# Patient Record
Sex: MALE | Race: BLACK OR AFRICAN AMERICAN | NOT HISPANIC OR LATINO | Employment: FULL TIME | URBAN - METROPOLITAN AREA
[De-identification: names, ages, dates, MRNs, and addresses within clinical notes are randomized per-mention and may not be internally consistent; named-entity substitution may affect disease eponyms.]

---

## 2018-01-11 NOTE — RESULT NOTES
Verified Results  XR SPINE LUMBAR COMPLETE [facility] MINIMUM 6 VIEWS [id number] 08:31AM [facility] Order Number: [id number]     Test Name Result Flag Reference   XR SPINE LUMBAR COMPLETE W BENDING MINIMUM 6 VIEWS (Report)     LUMBAR SPINE     INDICATION: Low back pain extending into right hip increasing over last several months     COMPARISON: March 5, 2015     VIEWS: AP, bilateral oblique, coned down and lateral projections in neutral, flexion and extension; 8 images     FINDINGS:     Alignment is unremarkable  There is no subluxation and alignment is stable in flexion, extension, and neutral positioning  There is no radiographic evidence of acute fracture or destructive osseous lesion  No significant lumbar degenerative change noted  Visualized soft tissues appear unremarkable         IMPRESSION:     Unremarkable lumbar spine exam        Workstation performed: [id number]     Signed by:   Ita Hong MD   10/25/16

## 2018-01-15 NOTE — RESULT NOTES
Verified Results  (1) CBC/PLT/DIFF [id number] 01:44PM [facility]     Test Name Result Flag Reference   WHITE BLOOD CELL COUNT 2 9 Thousand/uL L 3 8-10 8   RED BLOOD CELL COUNT 4 74 Million/uL  4 20-5 80   HEMOGLOBIN 14 3 g/dL  13 2-17 1   HEMATOCRIT 42 9 %  38 5-50 0   MCV 90 6 fL  80 0-100 0   MCH 30 2 pg  27 0-33 0   MCHC 33 4 g/dL  32 0-36 0   RDW 12 4 %  11 0-15 0   PLATELET COUNT 902 Thousand/uL  140-400   MPV 9 4 fL  7 5-11 5   ABSOLUTE NEUTROPHILS 1322 cells/uL L 6327-1233   ABSOLUTE LYMPHOCYTES 1357 cells/uL  850-3900   ABSOLUTE MONOCYTES 160 cells/uL L 200-950   ABSOLUTE EOSINOPHILS 35 cells/uL     ABSOLUTE BASOPHILS 26 cells/uL  0-200   NEUTROPHILS 45 6 %     LYMPHOCYTES 46 8 %     MONOCYTES 5 5 %     EOSINOPHILS 1 2 %     BASOPHILS 0 9 %       (1) C-REACTIVE PROTEIN [id number] 01:44PM [facility]   REPORT COMMENT:  Patti Scanlon # [id number]  FASTING:NO     Test Name Result Flag Reference   C-REACTIVE PROTEIN <0 10 mg/dL  <0 80   Please be advised that patients taking Carboxypenicillins  may exhibit falsely decreased C-Reactive Protein levels  due to an analytical interference in this assay  (Q) SED RATE BY MODIFIED Paul Padilla [id number] 01:44PM [facility]     Test Name Result Flag Reference   SED RATE BY MODIFIED$CIRILO 2 mm/h  < OR = 15     (Q) LYME DISEASE AB, TOTAL W/REFL WB (IGG, IGM) [id number] 01:44PM [facility]     Test Name Result Flag Reference   LYME AB SCREEN < OR = 0 90 index     Index                 Interpretation                     -----                 --------------                     < or = 0 90           Negative                     0  91-1 09             Equivocal                     > or = 1 10           Positive     The use of purified VlsE-1 and PepC10 antigens in this  assay provides improved specificity compared to assays  that utilize whole cell lysates of B  burgdorferi, the  causative agent of Lyme disease, and slightly better  sensitivity compared to the C6 antibody assay  As recommended by the Food and Drug   Administration (FDA), all samples with positive or   equivocal results in a Borrelia burgdorferi antibody    EIA (screening) will be tested using a blot method  Positive or equivocal screening test results should not   be interpreted as truly positive until verified as such   using a supplemental assay (e g , B  burgdorferi blot)  The screening test and/or blot for B  burgdorferi   antibodies may be falsely negative in early stages of   Lyme disease, including the period when erythema   migrans is apparent

## 2018-01-18 NOTE — MISCELLANEOUS
Message   To whom it may concern,    Please excuse Delfino Kamara from work until we see him back in the office on 6/29/2016       Thank you,  Maxime Chang        Signatures   Electronically signed by : Maxime Chang, [facility]; Jun 20 2016 11:39AM EST                       (Author)

## 2018-02-28 ENCOUNTER — HOSPITAL ENCOUNTER (EMERGENCY)
Facility: HOSPITAL | Age: 33
Discharge: HOME | End: 2018-02-28
Admitting: EMERGENCY MEDICINE
Payer: COMMERCIAL

## 2018-02-28 ENCOUNTER — APPOINTMENT (EMERGENCY)
Dept: RADIOLOGY | Facility: HOSPITAL | Age: 33
End: 2018-02-28
Payer: COMMERCIAL

## 2018-02-28 VITALS
DIASTOLIC BLOOD PRESSURE: 68 MMHG | OXYGEN SATURATION: 98 % | RESPIRATION RATE: 18 BRPM | BODY MASS INDEX: 26.68 KG/M2 | TEMPERATURE: 97.4 F | WEIGHT: 197 LBS | HEIGHT: 72 IN | SYSTOLIC BLOOD PRESSURE: 154 MMHG | HEART RATE: 67 BPM

## 2018-02-28 DIAGNOSIS — S91.312A LACERATION OF LEFT FOOT, INITIAL ENCOUNTER: Primary | ICD-10-CM

## 2018-02-28 PROCEDURE — 73630 X-RAY EXAM OF FOOT: CPT

## 2018-02-28 PROCEDURE — 90715 TDAP VACCINE 7 YRS/> IM: CPT | Performed by: PHYSICIAN ASSISTANT

## 2018-02-28 PROCEDURE — 90471 IMMUNIZATION ADMIN: CPT

## 2018-02-28 PROCEDURE — 99283 EMERGENCY DEPT VISIT LOW MDM: CPT

## 2018-02-28 RX ORDER — BACITRACIN, NEOMYCIN, POLYMYXIN B 400; 3.5; 5 [USP'U]/G; MG/G; [USP'U]/G
1 OINTMENT TOPICAL ONCE
Status: COMPLETED | OUTPATIENT
Start: 2018-02-28 | End: 2018-02-28

## 2018-02-28 RX ORDER — LIDOCAINE HYDROCHLORIDE 20 MG/ML
5 INJECTION, SOLUTION EPIDURAL; INFILTRATION; INTRACAUDAL; PERINEURAL ONCE
Status: COMPLETED | OUTPATIENT
Start: 2018-02-28 | End: 2018-02-28

## 2018-02-28 RX ADMIN — TETANUS TOXOID, REDUCED DIPHTHERIA TOXOID AND ACELLULAR PERTUSSIS VACCINE, ADSORBED 0.5 ML: 5; 2.5; 8; 8; 2.5 SUSPENSION INTRAMUSCULAR at 17:04

## 2018-02-28 RX ADMIN — Medication 1 APPLICATION: at 17:05

## 2018-02-28 RX ADMIN — BACITRACIN, NEOMYCIN, POLYMYXIN B 1 SMALL APPLICATION: 400; 3.5; 5 OINTMENT TOPICAL at 18:22

## 2018-02-28 RX ADMIN — LIDOCAINE HYDROCHLORIDE 5 ML: 20 INJECTION, SOLUTION EPIDURAL; INFILTRATION; INTRACAUDAL; PERINEURAL at 17:59

## 2018-02-28 NOTE — DISCHARGE INSTRUCTIONS
Laceration   WHAT YOU NEED TO KNOW:   A laceration is an injury to the skin and the soft tissue underneath it  Lacerations happen when you are cut or hit by something  They can happen anywhere on the body  DISCHARGE INSTRUCTIONS:   Return to the emergency department if:   · You have heavy bleeding or bleeding that does not stop after 10 minutes of holding firm, direct pressure over the wound  · Your wound opens up  Contact your healthcare provider if:   · You have a fever or chills  · Your laceration is red, warm, or swollen  · You have red streaks on your skin coming from your wound  · You have white or yellow drainage from the wound that smells bad  · You have pain that gets worse, even after treatment  · You have questions or concerns about your condition or care  Medicines:   · Prescription pain medicine  may be given  Ask how to take this medicine safely  · Antibiotics  help treat or prevent a bacterial infection  · Take your medicine as directed  Contact your healthcare provider if you think your medicine is not helping or if you have side effects  Tell him or her if you are allergic to any medicine  Keep a list of the medicines, vitamins, and herbs you take  Include the amounts, and when and why you take them  Bring the list or the pill bottles to follow-up visits  Carry your medicine list with you in case of an emergency  Care for your wound as directed:   · Do not get your wound wet  until your healthcare provider says it is okay  Do not soak your wound in water  Do not go swimming until your healthcare provider says it is okay  Carefully wash the wound with soap and water  Gently pat the area dry or allow it to air dry  · Change your bandages  when they get wet, dirty, or after washing  Apply new, clean bandages as directed  Do not apply elastic bandages or tape too tight  Do not put powders or lotions over your incision  · Apply antibiotic ointment as directed  Your healthcare provider may give you antibiotic ointment to put over your wound if you have stitches  If you have strips of tape over your incision, let them dry up and fall off on their own  If they do not fall off within 14 days, gently remove them  If you have glue over your wound, do not remove or pick at it  If your glue comes off, do not replace it with glue that you have at home  · Check your wound every day for signs of infection such as swelling, redness, or pus  Self-care:   · Apply ice  on your wound for 15 to 20 minutes every hour or as directed  Use an ice pack, or put crushed ice in a plastic bag  Cover it with a towel  Ice helps prevent tissue damage and decreases swelling and pain  · Use a splint as directed  A splint will decrease movement and stress on your wound  It may help it heal faster  A splint may be used for lacerations over joints or areas of your body that bend  Ask your healthcare provider how to apply and remove a splint  · Decrease scarring of your wound  by applying ointments as directed  Do not apply ointments until your healthcare provider says it is okay  You may need to wait until your wound is healed  Ask which ointment to buy and how often to use it  After your wound is healed, use sunscreen over the area when you are out in the sun  You should do this for at least 6 months to 1 year after your injury  Follow up with your healthcare provider as directed: You may need to follow up in 24 to 48 hours to have your wound checked for infection  You will need to return in 3 to 14 days if you have stitches or staples so they can be removed  Care for your wound as directed to prevent infection and help it heal  Write down your questions so you remember to ask them during your visits  © 2017 [employer] Information is for End User's use only and may not be sold, redistributed or otherwise used for commercial purposes   All illustrations and images included in [facility] are the copyrighted property of A D A M [employer] Inc  or Vic Pagan  The above information is an  only  It is not intended as medical advice for individual conditions or treatments  Talk to your doctor, nurse or pharmacist before following any medical regimen to see if it is safe and effective for you  KEEP DRY FOR 24 HOURS  APPLY NEOSPORIN YOU MAY COVER WITH BANDAGE  SUTURE REMOVAL IN 8-10 DAYS  NO EXERCISE      ER RETURN FOR SIGNS OF INFECTION - REDNESS, PUS, SWELLING

## 2018-02-28 NOTE — ED PROVIDER NOTES
History  Chief Complaint   Patient presents with    Foot Injury     pt was lifitng weights and had 25lb plate fall off and land on is left foot noted that he had bleeding and what apperars to be a large cut on his foot        [de-identified] yo male who presents for evaluation of foot injury that occurred just prior to arrival   Patient states he was working out when he dropped a 25 lb plate onto his left foot and noticed bleeding on his shoe, took the shoe off and noticed he sustained a laceration  He describes a throbbing pain along the top of his foot  He Did not clean the area prior to arrival   The bleeding has been controlled  His last tetanus is unknown  He denies any paresthesias numbness or tingling  He has been ambulating but with difficulty  None       History reviewed  No pertinent past medical history  History reviewed  No pertinent surgical history  History reviewed  No pertinent family history  I have reviewed and agree with the history as documented  Social History   Substance Use Topics    Smoking status: Never Smoker    Smokeless tobacco: Not on file    Alcohol use No        Review of Systems   Musculoskeletal: Positive for arthralgias and gait problem  Skin: Positive for wound  Negative for color change  Neurological: Negative for weakness and numbness  Physical Exam  ED Triage Vitals [02/28/18 1608]   Temperature Pulse Respirations Blood Pressure SpO2   (!) 97 4 °F (36 3 °C) 67 18 154/68 98 %      Temp Source Heart Rate Source Patient Position - Orthostatic VS BP Location FiO2 (%)   Tympanic -- -- -- --      Pain Score       4           Orthostatic Vital Signs  Vitals:    02/28/18 1608   BP: 154/68   Pulse: 67       Physical Exam   Constitutional: He is oriented to person, place, and time  He appears well-developed and well-nourished  No distress  HENT:   Head: Normocephalic and atraumatic     Right Ear: External ear normal    Left Ear: External ear normal  Nose: Nose normal    Mouth/Throat: Oropharynx is clear and moist    Eyes: Conjunctivae and EOM are normal  Pupils are equal, round, and reactive to light  Neck: Normal range of motion  Neck supple  Cardiovascular: Normal rate, regular rhythm and normal heart sounds  Exam reveals no gallop and no friction rub  No murmur heard  Pulmonary/Chest: Effort normal and breath sounds normal  No respiratory distress  He has no wheezes  He has no rales  Musculoskeletal: Normal range of motion  Left ankle: Normal         Left foot: There is tenderness, bony tenderness and laceration  There is normal range of motion, no swelling, normal capillary refill, no crepitus and no deformity  Feet:    Dp pulse 2+, normal ankle ROM, wiggles toes, distal sensation and circulation intact, cap refill <2s   Neurological: He is alert and oriented to person, place, and time  Skin: Skin is warm and dry  Capillary refill takes less than 2 seconds  No rash noted  He is not diaphoretic  Psychiatric: He has a normal mood and affect  Nursing note and vitals reviewed  ED Medications  Medications   neomycin-bacitracin-polymyxin b (NEOSPORIN) ointment 1 small application (not administered)   tetanus-diphtheria-acellular pertussis (BOOSTRIX) IM injection 0 5 mL (0 5 mL Intramuscular Given 2/28/18 1704)   LET gel 1 application (1 application Topical Given 2/28/18 1705)   lidocaine (PF) (XYLOCAINE-MPF) 2 % injection 5 mL (5 mL Infiltration Given 2/28/18 1759)       Diagnostic Studies  Results Reviewed     None                 XR foot 3+ views LEFT   ED Interpretation by Betty Neri PA-C (02/28 1714)   No fracture      Final Result by Kenyon Dakin, MD (02/28 1718)      No acute osseous abnormality           Workstation performed: [id number]                    Procedures  Lac Repair  Date/Time: 2/28/2018 6:19 PM  Performed by: Sallie Hammer  Authorized by: Sallie Hammer   Consent: Verbal consent obtained  Consent given by: patient  Patient identity confirmed: verbally with patient  Body area: lower extremity  Location details: left foot  Laceration length: 1 5 cm  Foreign bodies: no foreign bodies  Tendon involvement: none  Nerve involvement: none  Vascular damage: no  Anesthesia: local infiltration    Anesthesia:  Local Anesthetic: lidocaine 2% without epinephrine and LET (lido,epi,tetracaine)    Sedation:  Patient sedated: no      Procedure Details:  Irrigation solution: saline  Irrigation method: tap  Amount of cleaning: standard  Skin closure: 5-0 nylon  Number of sutures: 4  Technique: simple  Approximation: close  Approximation difficulty: simple  Dressing: antibiotic ointment  Patient tolerance: Patient tolerated the procedure well with no immediate complications             Phone Contacts  ED Phone Contact    ED Course  ED Course                                MDM  Number of Diagnoses or Management Options  Laceration of left foot, initial encounter: new and requires workup  Diagnosis management comments:   77-year-old male who sustained a laceration to the dorsum of his left foot after dropping a 25 lb metal plate on it  X-ray negative for fracture  His tetanus was updated  Wound closure with 4 sutures  Wound care instructions were given  Return to ED precautions discussed  Patient verbalizes understanding and agrees with the plan         Amount and/or Complexity of Data Reviewed  Tests in the radiology section of CPT®: ordered and reviewed  Independent visualization of images, tracings, or specimens: yes    Patient Progress  Patient progress: stable    CritCare Time    Disposition  Final diagnoses:   Laceration of left foot, initial encounter     Time reflects when diagnosis was documented in both MDM as applicable and the Disposition within this note     Time User Action Codes Description Comment    2/28/2018  6:19 PM Jean Varner Add [P22 306A] Laceration of left foot, initial encounter ED Disposition     ED Disposition Condition Comment    Discharge  Rommie Drew discharge to home/self care  Condition at discharge: Good        Follow-up Information     Follow up With Specialties Details Why Contact Info Additional Information    [facility] Go to 8- 10 DAYS , For suture removal [address] [facility], [facility] [address] [facility]  [phone] Via the [address] [facility] [address] Take [address] toward [facility]  Take the [facility] [address]  Keep right and follow signs for [address]/ [place]  Merge onto [address] [facility]  In a half mile, take the exit for [address] toward [place]  In 0 7 miles take the [facility] [employer]  Merge onto [facility]  In 500 feet, turn left on [address] and [address] 0 3 miles  [address] will be on your left  [address]) [address] toward [place]  Take the [facility] [employer]  Merge onto [facility]  In 500 feet, turn left on [address] and drive 0 3 miles  [address] will be on your left  [address] Take [address] to [address] towards [place]  In 0 7 miles take the [facility] [employer]  Merge onto [facility]  In 500 feet, turn left on [address] and drive 0 3 miles  [address] will be on your left  [address] [facility]  Urgent Care Go to For suture removal IN 8-10 DAYS [address] [address] [facility], [address] [facility] [place], [place], [address]        Patient's Medications   Discharge Prescriptions    No medications on file     No discharge procedures on file      ED Provider  Electronically Signed by           Abel Ochoa PA-C  03/02/18 9604

## 2018-03-09 ENCOUNTER — OFFICE VISIT (OUTPATIENT)
Dept: FAMILY MEDICINE CLINIC | Facility: CLINIC | Age: 33
End: 2018-03-09
Payer: COMMERCIAL

## 2018-03-09 VITALS
BODY MASS INDEX: 28.56 KG/M2 | RESPIRATION RATE: 18 BRPM | TEMPERATURE: 98.2 F | HEIGHT: 71 IN | HEART RATE: 56 BPM | WEIGHT: 204 LBS | DIASTOLIC BLOOD PRESSURE: 70 MMHG | SYSTOLIC BLOOD PRESSURE: 122 MMHG

## 2018-03-09 DIAGNOSIS — S95.012S: Primary | ICD-10-CM

## 2018-03-09 PROCEDURE — 99213 OFFICE O/P EST LOW 20 MIN: CPT | Performed by: FAMILY MEDICINE

## 2018-03-09 PROCEDURE — 3008F BODY MASS INDEX DOCD: CPT | Performed by: FAMILY MEDICINE

## 2018-03-09 NOTE — PROGRESS NOTES
Suture removal  Date/Time: 3/9/2018 12:57 PM  Performed by: Ab Uriarte by: Inocente Perez     Patient location:  Clinic  Other Assisting Provider: No    Consent:     Consent obtained:  Verbal    Consent given by:  Patient    Risks discussed:  Bleeding and wound separation    Alternatives discussed:  No treatment  Universal protocol:     Procedure explained and questions answered to patient or proxy's satisfaction: yes    Location:     Laterality:  Left    Location:  Lower extremity    Lower extremity location:  Foot    Foot location:  L foot  Procedure details: Tools used:  Suture removal kit    Wound appearance:  No sign(s) of infection, good wound healing, clean, pink and nontender    Number of sutures removed:  4  Post-procedure details:     Post-removal:  Band-Aid applied    Patient tolerance of procedure:   Tolerated well, no immediate complications

## 2018-04-02 ENCOUNTER — HOSPITAL ENCOUNTER (EMERGENCY)
Facility: HOSPITAL | Age: 33
Discharge: HOME | End: 2018-04-02
Attending: EMERGENCY MEDICINE
Payer: COMMERCIAL

## 2018-04-02 ENCOUNTER — APPOINTMENT (EMERGENCY)
Dept: RADIOLOGY | Facility: HOSPITAL | Age: 33
End: 2018-04-02
Payer: COMMERCIAL

## 2018-04-02 VITALS
RESPIRATION RATE: 12 BRPM | OXYGEN SATURATION: 98 % | DIASTOLIC BLOOD PRESSURE: 58 MMHG | WEIGHT: 200 LBS | SYSTOLIC BLOOD PRESSURE: 121 MMHG | BODY MASS INDEX: 27.89 KG/M2 | HEART RATE: 50 BPM | TEMPERATURE: 98.3 F

## 2018-04-02 DIAGNOSIS — S20.219A CHEST WALL CONTUSION: Primary | ICD-10-CM

## 2018-04-02 LAB
ANION GAP BLD CALC-SCNC: 14 MMOL/L (ref 4–13)
ANION GAP SERPL CALCULATED.3IONS-SCNC: 4 MMOL/L (ref 4–13)
ATRIAL RATE: 63 BPM
BUN BLD-MCNC: 18 MG/DL (ref 5–25)
BUN SERPL-MCNC: 17 MG/DL (ref 5–25)
CA-I BLD-SCNC: 1.23 MMOL/L (ref 1.12–1.32)
CALCIUM SERPL-MCNC: 8.8 MG/DL (ref 8.3–10.1)
CHLORIDE BLD-SCNC: 100 MMOL/L (ref 100–108)
CHLORIDE SERPL-SCNC: 103 MMOL/L (ref 100–108)
CO2 SERPL-SCNC: 30 MMOL/L (ref 21–32)
CREAT BLD-MCNC: 1.2 MG/DL (ref 0.6–1.3)
CREAT SERPL-MCNC: 1.25 MG/DL (ref 0.6–1.3)
GFR SERPL CREATININE-BSD FRML MDRD: 88 ML/MIN/1.73SQ M
GFR SERPL CREATININE-BSD FRML MDRD: 93 ML/MIN/1.73SQ M
GLUCOSE SERPL-MCNC: 80 MG/DL (ref 65–140)
GLUCOSE SERPL-MCNC: 82 MG/DL (ref 65–140)
HCT VFR BLD CALC: 40 % (ref 36.5–49.3)
HGB BLDA-MCNC: 13.6 G/DL (ref 12–17)
PCO2 BLD: 29 MMOL/L (ref 21–32)
POTASSIUM BLD-SCNC: 4 MMOL/L (ref 3.5–5.3)
POTASSIUM SERPL-SCNC: 4 MMOL/L (ref 3.5–5.3)
QRS AXIS: 82 DEGREES
QRSD INTERVAL: 78 MS
QT INTERVAL: 412 MS
QTC INTERVAL: 404 MS
SODIUM BLD-SCNC: 139 MMOL/L (ref 136–145)
SODIUM SERPL-SCNC: 137 MMOL/L (ref 136–145)
SPECIMEN SOURCE: ABNORMAL
T WAVE AXIS: 69 DEGREES
TROPONIN I SERPL-MCNC: <0.02 NG/ML
VENTRICULAR RATE: 58 BPM

## 2018-04-02 PROCEDURE — 71260 CT THORAX DX C+: CPT

## 2018-04-02 PROCEDURE — 93005 ELECTROCARDIOGRAM TRACING: CPT

## 2018-04-02 PROCEDURE — 85014 HEMATOCRIT: CPT

## 2018-04-02 PROCEDURE — 99284 EMERGENCY DEPT VISIT MOD MDM: CPT

## 2018-04-02 PROCEDURE — 96374 THER/PROPH/DIAG INJ IV PUSH: CPT

## 2018-04-02 PROCEDURE — 80047 BASIC METABLC PNL IONIZED CA: CPT

## 2018-04-02 PROCEDURE — 36415 COLL VENOUS BLD VENIPUNCTURE: CPT | Performed by: EMERGENCY MEDICINE

## 2018-04-02 PROCEDURE — 84484 ASSAY OF TROPONIN QUANT: CPT | Performed by: EMERGENCY MEDICINE

## 2018-04-02 PROCEDURE — 80048 BASIC METABOLIC PNL TOTAL CA: CPT | Performed by: EMERGENCY MEDICINE

## 2018-04-02 RX ORDER — IBUPROFEN 600 MG/1
600 TABLET ORAL EVERY 6 HOURS PRN
Qty: 30 TABLET | Refills: 0 | Status: SHIPPED | OUTPATIENT
Start: 2018-04-02 | End: 2018-12-19 | Stop reason: ALTCHOICE

## 2018-04-02 RX ORDER — MORPHINE SULFATE 10 MG/ML
9 INJECTION, SOLUTION INTRAMUSCULAR; INTRAVENOUS ONCE
Status: COMPLETED | OUTPATIENT
Start: 2018-04-02 | End: 2018-04-02

## 2018-04-02 RX ORDER — SENNOSIDES 8.6 MG
650 CAPSULE ORAL EVERY 8 HOURS PRN
Qty: 30 TABLET | Refills: 0 | Status: SHIPPED | OUTPATIENT
Start: 2018-04-02 | End: 2018-12-19 | Stop reason: ALTCHOICE

## 2018-04-02 RX ORDER — MORPHINE SULFATE 4 MG/ML
4 INJECTION, SOLUTION INTRAMUSCULAR; INTRAVENOUS ONCE
Status: DISCONTINUED | OUTPATIENT
Start: 2018-04-02 | End: 2018-04-02

## 2018-04-02 RX ORDER — MORPHINE SULFATE 15 MG/1
7.5 TABLET ORAL EVERY 4 HOURS PRN
Qty: 5 TABLET | Refills: 0 | Status: SHIPPED | OUTPATIENT
Start: 2018-04-02 | End: 2018-04-12

## 2018-04-02 RX ADMIN — IOHEXOL 85 ML: 350 INJECTION, SOLUTION INTRAVENOUS at 19:33

## 2018-04-02 RX ADMIN — MORPHINE SULFATE 9 MG: 10 INJECTION, SOLUTION INTRAMUSCULAR; INTRAVENOUS at 18:44

## 2018-04-02 NOTE — ED PROVIDER NOTES
History  Chief Complaint   Patient presents with    Chest Injury     Pt was at the gym working out when his arm gave out and 275lbs of weight fell onto his chest  Pt complains of chest pain      HPI    This is otherwise healthy 72-year-old male who is presenting for evaluation of chest pain after he dropped  275 pounds onto his chest wall while doing a chest press  Patient immediately came to the Ed, stating he has pain with inspiration  He is taking shallow breaths however he still having localized pain over the bar fell onto his chest    Patient denying any loss of consciousness or hitting his head  He states the  Barbell fell because his right arm had slipped  He denies any difficulty moving his right arm  Remainder ROS negative  None       History reviewed  No pertinent past medical history  History reviewed  No pertinent surgical history  Family History   Problem Relation Age of Onset    Heart disease Mother     Heart disease Maternal Grandfather      I have reviewed and agree with the history as documented  Social History   Substance Use Topics    Smoking status: Never Smoker    Smokeless tobacco: Never Used    Alcohol use No        Review of Systems   Constitutional: Negative for chills, fatigue and fever  HENT: Negative for sore throat  Eyes: Negative for redness and visual disturbance  Respiratory: Negative for shortness of breath  Cardiovascular: Positive for chest pain  Negative for palpitations  Gastrointestinal: Negative for abdominal pain and diarrhea  Endocrine: Negative for polyuria  Genitourinary: Negative for difficulty urinating  Skin: Negative for color change, pallor, rash and wound  Psychiatric/Behavioral: Negative for dysphoric mood         Physical Exam  ED Triage Vitals [04/02/18 1813]   Temperature Pulse Respirations Blood Pressure SpO2   98 3 °F (36 8 °C) 67 20 129/60 100 %      Temp Source Heart Rate Source Patient Position - Orthostatic VS BP Location FiO2 (%)   Oral Monitor Sitting Left arm --      Pain Score       Worst Possible Pain           Orthostatic Vital Signs  Vitals:    04/02/18 1911 04/02/18 1945 04/02/18 2102 04/02/18 2115   BP: 128/57 131/56 121/58 121/58   Pulse: 60 (!) 54 (!) 51 (!) 50   Patient Position - Orthostatic VS: Lying Lying Lying        Physical Exam   Constitutional: He is oriented to person, place, and time  He appears well-developed and well-nourished  HENT:   Head: Normocephalic and atraumatic  Eyes: Conjunctivae are normal    Neck: Normal range of motion  Cardiovascular: Regular rhythm and normal heart sounds  Bradycardic   Pulmonary/Chest: Effort normal and breath sounds normal  He has no wheezes  He exhibits no tenderness  The patient is taking rapid shallow breaths   Abdominal: Soft  Bowel sounds are normal    Musculoskeletal: Normal range of motion  No spinal neck tenderness  Tenderness to palpation across the chest around T5   Neurological: He is alert and oriented to person, place, and time  No cranial nerve deficit or sensory deficit  He exhibits normal muscle tone  Coordination normal    Skin: Skin is warm and dry  Capillary refill takes less than 2 seconds  No rash noted  No overlying ecchymosis or signs of trauma  Psychiatric: He has a normal mood and affect  Nursing note and vitals reviewed        ED Medications  Medications   morphine (PF) 10 mg/mL injection 9 mg (9 mg Intravenous Given 4/2/18 1844)   iohexol (OMNIPAQUE) 350 MG/ML injection (MULTI-DOSE) 85 mL (85 mL Intravenous Given 4/2/18 1933)       Diagnostic Studies  Results Reviewed     Procedure Component Value Units Date/Time    Troponin I [[id number]]  (Normal) Collected:  04/02/18 1838    Lab Status:  Final result Specimen:  Blood from Arm, Left Updated:  04/02/18 1909     Troponin I <0 02 ng/mL     Narrative:         Siemens Chemistry analyzer 99% cutoff is > 0 04 ng/mL in network labs    o cTnI 99% cutoff is useful only when applied to patients in the clinical setting of myocardial ischemia  o cTnI 99% cutoff should be interpreted in the context of clinical history, ECG findings and possibly cardiac imaging to establish correct diagnosis  o cTnI 99% cutoff may be suggestive but clearly not indicative of a coronary event without the clinical setting of myocardial ischemia  Basic metabolic panel [[id number]] Collected:  04/02/18 1838    Lab Status:  Final result Specimen:  Blood from Arm, Left Updated:  04/02/18 1902     Sodium 137 mmol/L      Potassium 4 0 mmol/L      Chloride 103 mmol/L      CO2 30 mmol/L      Anion Gap 4 mmol/L      BUN 17 mg/dL      Creatinine 1 25 mg/dL      Glucose 80 mg/dL      Calcium 8 8 mg/dL      eGFR 88 ml/min/1 73sq m     Narrative:         National Kidney Disease Education Program recommendations are as follows:  GFR calculation is accurate only with a steady state creatinine  Chronic Kidney disease less than 60 ml/min/1 73 sq  meters  Kidney failure less than 15 ml/min/1 73 sq  meters  POCT Chem 8+ [[id number]]  (Abnormal) Collected:  04/02/18 1844    Lab Status:  Final result Updated:  04/02/18 1849     SODIUM, I-STAT 139 mmol/l      Potassium, i-STAT 4 0 mmol/L      Chloride, istat 100 mmol/L      CO2, i-STAT 29 mmol/L      Anion Gap, Istat 14 (H) mmol/L      Calcium, Ionized i-STAT 1 23 mmol/L      BUN, I-STAT 18 mg/dl      Creatinine, i-STAT 1 2 mg/dl      eGFR 93 ml/min/1 73sq m      Glucose, i-STAT 82 mg/dl      Hct, i-STAT 40 %      Hgb, i-STAT 13 6 g/dl      Specimen Type VENOUS                 CT chest with contrast   ED Interpretation by Evens Cabello MD (04/02 2002)   CT ordered by myself and the report from radiologist has been reviewed         Final Result by Matteo Jackson MD (04/02 1959)      Unremarkable examination of the chest                Workstation performed: [id number]               Procedures  ECG 12 Lead Documentation  Date/Time: 4/2/2018 7:16 PM  Performed by: Vola Scheuermann, BRYAN  Authorized by: Kyra Remy     Previous ECG:     Previous ECG:  Unavailable  Interpretation:     Interpretation: abnormal    Rate:     ECG rate assessment: bradycardic    Rhythm:     Rhythm: sinus bradycardia    QRS:     QRS axis:  Normal  ST segments:     ST segments:  Normal  T waves:     T waves: normal    Comments:      Prolonged DC interval          Phone Consults  ED Phone Contact    ED Course  ED Course as of Apr 03 0110   Mon Apr 02, 2018   1912 Troponin I: <0 02           [facility]  CritCare Time    This is a 29-year-old male presenting for evaluation of chest wall tenderness as well as pain on inspiration after dropping a barbell onto his chest     Will get a EKG to rule out blunt cardiac injury as well as troponin and CT chest       EKG shows sinus bradycardia as well as prolonged Er  Troponin negative  CT chest demonstrates no fractures or pathology  Patient will be discharged with pain control and   Incentive spirometer  Will follow up with PCP  The patient was instructed to follow up as documented  Strict return precautions were discussed with the patient and the patient was instructed to return to the emergency department immediately if symptoms worsen  The patient/patient family member acknowledged and were in agreement with plan  Disposition  Final diagnoses:   Chest wall contusion     Time reflects when diagnosis was documented in both MDM as applicable and the Disposition within this note     Time User Action Codes Description Comment    4/2/2018  8:50 PM Dianne Yi 366 Chest wall contusion       ED Disposition     ED Disposition Condition Comment    Discharge  Castillo Byrnes discharge to home/self care      Condition at discharge: Good        Follow-up Information     Follow up With Specialties Details Why Contact Info Additional Information    Gerson Carrion MD [facility] Medicine Schedule an appointment as soon as possible for a visit in 3 days For follow up regarding your symptoms [address]  [phone]       [facility] Emergency Department Emergency Medicine Go to If symptoms worsen [address]  [phone] [facility] ED, [address], [place], [place], [address]        Discharge Medication List as of 4/2/2018  9:07 PM      START taking these medications    Details   acetaminophen (TYLENOL) 650 mg CR tablet Take 1 tablet (650 mg total) by mouth every 8 (eight) hours as needed for mild pain, Starting Mon 4/2/2018, Print      ibuprofen (MOTRIN) 600 mg tablet Take 1 tablet (600 mg total) by mouth every 6 (six) hours as needed for mild pain, Starting Mon 4/2/2018, Print      morphine (MSIR) 15 mg tablet Take 0 5 tablets (7 5 mg total) by mouth every 4 (four) hours as needed for severe pain for up to 10 days Max Daily Amount: 45 mg, Starting Mon 4/2/2018, Until Thu 4/12/2018, Print           No discharge procedures on file  ED Provider  Attending physically available and evaluated Juan Manuel Schneider  HIMANSHU managed the patient along with the ED Attending      Electronically Signed by         Kendra Eckert MD  04/03/18 0150

## 2018-04-02 NOTE — ED ATTENDING ATTESTATION
Viet Delatorre MD, saw and evaluated the patient  All available labs and X-rays were ordered by me or the resident and have been reviewed by myself  I discussed the patient with the resident / non-physician and agree with the resident's / non-physician practitioner's findings and plan as documented in the resident's / non-physician practicitioner's note, except where noted  At this point, I agree with the current assessment done in the ED  Chief Complaint   Patient presents with    Chest Injury     Pt was at the gym working out when his arm gave out and 275lbs of weight fell onto his chest  Pt complains of chest pain      This is a 82-year-old male presenting for evaluation of chest pain  The patient states that he was at the gym working out when his right arm gave out, he felt he had no strength in it  He was weight lifting / bench pressing 275lbbs when his arm gave out  It hit him on the chest   No f/ch/n/v   +SOB b/c breathing causes chest pain where it hit  Denies dizziness/LH  Denies falls  No blood thinners  Most comfortable laying flat  PE:  Vitals:    04/02/18 1911 04/02/18 1945 04/02/18 2102 04/02/18 2115   BP: 128/57 131/56 121/58 121/58   BP Location: Right arm Right arm Right arm    Pulse: 60 (!) 54 (!) 51 (!) 50   Resp: 16 14 16 12   Temp:       TempSrc:       SpO2: 100% 100% 97% 98%   Weight:       General: VSS, NAD, awake, alert  Well-nourished, well-developed  Appears stated age  Head: Normocephalic, atraumatic, nontender  Eyes: PERRL, EOM-I  No diplopia  No hyphema  No subconjunctival hemorrhages  Symmetrical lids  ENT: Atraumatic external nose and ears  MMM  No malocclusion  No stridor  Normal phonation  No drooling  Normal swallowing  Neck: Symmetric, trachea midline  No JVD    No midline neck tenderness  CV: RRR  +S1/S2  No murmurs or gallops  Peripheral pulses +2 throughout  +chest wall tenderness w/o bruising  No step off or obvious rib fx  Lungs:   Unlabored No retractions  CTAB, lungs sounds equal bilateral    No crepitus  No tachypnea  No paradoxical motion  Abd: +BS, soft, NT/ND  No guarding  No rigidity  MSK:   Extremities without tenderness or gross deformity  FROM   No lower extremity edema  Back:   No C/L-spine tenderness  +T-spine tenderness (mild)  Skin: Dry, intact  Neuro: AAOx3, GCS 15, CN II-XII grossly intact  Motor grossly intact  Sensory grossly intact  Psychiatric/Behavioral: Appropriate mood and affect   Exam: deferred  A:  - Chest trauma  P:  - EKG/TRop for BCI  - CT with contrast for sternal fx, thoracic fracture, rib fx  - pain control  - dispo per above  - 13 point ROS was performed and all are normal unless stated in the history above  - Nursing note reviewed  Vitals reviewed  - Orders placed by myself and/or advanced practitioner / resident     - Previous chart was not reviewed  - No language barrier    - History obtained from patient  - There are no limitations to the history obtained  - Critical care time: Not applicable for this patient  Final Diagnosis:  1  Chest wall contusion      ED Course      Medications   morphine (PF) 10 mg/mL injection 9 mg (9 mg Intravenous Given 4/2/18 1844)   iohexol (OMNIPAQUE) 350 MG/ML injection (MULTI-DOSE) 85 mL (85 mL Intravenous Given 4/2/18 1933)     CT chest with contrast   ED Interpretation   CT ordered by myself and the report from radiologist has been reviewed         Final Result      Unremarkable examination of the chest                Workstation performed: [id number]           Orders Placed This Encounter   Procedures    ED ECG Documentation Only    CT chest with contrast    Basic metabolic panel    Troponin I    POCT chem 8 (CGB8)    ECG 12 lead    ECG 12 lead     Labs Reviewed   POCT CHEM 8+ - Abnormal        Result Value Ref Range Status    SODIUM, I-STAT 139  136 - 145 mmol/l Final    Potassium, i-STAT 4 0  3 5 - 5 3 mmol/L Final    Chloride, istat 100  100 - 108 mmol/L Final    CO2, i-STAT 29  21 - 32 mmol/L Final    Anion Gap, Istat 14 (*) 4 - 13 mmol/L Final    Calcium, Ionized i-STAT 1 23  1 12 - 1 32 mmol/L Final    BUN, I-STAT 18  5 - 25 mg/dl Final    Creatinine, i-STAT 1 2  0 6 - 1 3 mg/dl Final    eGFR 93  ml/min/1 73sq m Final    Glucose, i-STAT 82  65 - 140 mg/dl Final    Hct, i-STAT 40  36 5 - 49 3 % Final    Hgb, i-STAT 13 6  12 0 - 17 0 g/dl Final    Specimen Type VENOUS   Final   TROPONIN I - Normal    Troponin I <0 02  <=0 04 ng/mL Final    Narrative:     [employer] Chemistry analyzer 99% cutoff is > 0 04 ng/mL in network labs    o cTnI 99% cutoff is useful only when applied to patients in the clinical setting of myocardial ischemia  o cTnI 99% cutoff should be interpreted in the context of clinical history, ECG findings and possibly cardiac imaging to establish correct diagnosis  o cTnI 99% cutoff may be suggestive but clearly not indicative of a coronary event without the clinical setting of myocardial ischemia  BASIC METABOLIC PANEL    Sodium 882  136 - 145 mmol/L Final    Potassium 4 0  3 5 - 5 3 mmol/L Final    Chloride 103  100 - 108 mmol/L Final    CO2 30  21 - 32 mmol/L Final    Anion Gap 4  4 - 13 mmol/L Final    BUN 17  5 - 25 mg/dL Final    Creatinine 1 25  0 60 - 1 30 mg/dL Final    Comment: Standardized to IDMS reference method    Glucose 80  65 - 140 mg/dL Final    Comment:   If the patient is fasting, the ADA then defines impaired fasting glucose as > 100 mg/dL and diabetes as > or equal to 123 mg/dL  Specimen collection should occur prior to Sulfasalazine administration due to the potential for falsely depressed results  Specimen collection should occur prior to Sulfapyridine administration due to the potential for falsely elevated results      Calcium 8 8  8 3 - 10 1 mg/dL Final    eGFR 88  ml/min/1 73sq m Final    Narrative:     National Kidney Disease Education Program recommendations are as follows:  GFR calculation is accurate only with a steady state creatinine  Chronic Kidney disease less than 60 ml/min/1 73 sq  meters  Kidney failure less than 15 ml/min/1 73 sq  meters  Time reflects when diagnosis was documented in both MDM as applicable and the Disposition within this note     Time User Action Codes Description Comment    4/2/2018  8:50 PM Dianne Yi Genie 366 Chest wall contusion       ED Disposition     ED Disposition Condition Comment    Discharge  Castillo Byrnes discharge to home/self care  Condition at discharge: Good        Follow-up Information     Follow up With Specialties Details Why Contact Info Additional Information    Gerson Carrion MD Family Medicine Schedule an appointment as soon as possible for a visit in 3 days For follow up regarding your symptoms [address]  [phone]       [address] [place] [address] Emergency Department Emergency Medicine Go to If symptoms worsen [address] [facility] ED, [address] [place], [place], [address]        Discharge Medication List as of 4/2/2018  9:07 PM      START taking these medications    Details   acetaminophen (TYLENOL) 650 mg CR tablet Take 1 tablet (650 mg total) by mouth every 8 (eight) hours as needed for mild pain, Starting Mon 4/2/2018, Print      ibuprofen (MOTRIN) 600 mg tablet Take 1 tablet (600 mg total) by mouth every 6 (six) hours as needed for mild pain, Starting Mon 4/2/2018, Print      morphine (MSIR) 15 mg tablet Take 0 5 tablets (7 5 mg total) by mouth every 4 (four) hours as needed for severe pain for up to 10 days Max Daily Amount: 45 mg, Starting Mon 4/2/2018, Until Thu 4/12/2018, Print           No discharge procedures on file  None       Portions of the record may have been created with voice recognition software  Occasional wrong word or "sound a like" substitutions may have occurred due to the inherent limitations of voice recognition software   Read the chart carefully and recognize, using context, where substitutions have occurred      Electronically signed by:  Socrates Rosa

## 2018-04-03 NOTE — DISCHARGE INSTRUCTIONS

## 2018-12-19 ENCOUNTER — OFFICE VISIT (OUTPATIENT)
Dept: FAMILY MEDICINE CLINIC | Facility: CLINIC | Age: 33
End: 2018-12-19
Payer: COMMERCIAL

## 2018-12-19 ENCOUNTER — APPOINTMENT (OUTPATIENT)
Dept: RADIOLOGY | Facility: MEDICAL CENTER | Age: 33
End: 2018-12-19
Payer: COMMERCIAL

## 2018-12-19 VITALS
HEIGHT: 71 IN | RESPIRATION RATE: 14 BRPM | WEIGHT: 202 LBS | DIASTOLIC BLOOD PRESSURE: 64 MMHG | TEMPERATURE: 97.9 F | SYSTOLIC BLOOD PRESSURE: 100 MMHG | HEART RATE: 57 BPM | OXYGEN SATURATION: 97 % | BODY MASS INDEX: 28.28 KG/M2

## 2018-12-19 DIAGNOSIS — M25.522 PAIN OF BOTH ELBOWS: ICD-10-CM

## 2018-12-19 DIAGNOSIS — M25.522 PAIN OF BOTH ELBOWS: Primary | ICD-10-CM

## 2018-12-19 DIAGNOSIS — M25.521 PAIN OF BOTH ELBOWS: Primary | ICD-10-CM

## 2018-12-19 DIAGNOSIS — M25.521 PAIN OF BOTH ELBOWS: ICD-10-CM

## 2018-12-19 PROCEDURE — 73080 X-RAY EXAM OF ELBOW: CPT

## 2018-12-19 PROCEDURE — 1036F TOBACCO NON-USER: CPT | Performed by: FAMILY MEDICINE

## 2018-12-19 PROCEDURE — 99213 OFFICE O/P EST LOW 20 MIN: CPT | Performed by: FAMILY MEDICINE

## 2018-12-19 PROCEDURE — 3008F BODY MASS INDEX DOCD: CPT | Performed by: FAMILY MEDICINE

## 2018-12-19 NOTE — PROGRESS NOTES
Assessment/Plan:    No problem-specific Assessment & Plan notes found for this encounter  There are no diagnoses linked to this encounter  Subjective:      Patient ID: Rashid Guaman is a 34 y o  male  Elbow Pain   This is a chronic (started in r and now probs with l) problem  The current episode started more than 1 month ago  The problem occurs intermittently  The problem has been gradually worsening  Associated symptoms include arthralgias  Pertinent negatives include no chest pain or myalgias  The symptoms are aggravated by bending and exertion (pain with ADLs)  He has tried acetaminophen and ice for the symptoms  The treatment provided mild relief  The following portions of the patient's history were reviewed and updated as appropriate: allergies, current medications, past family history, past medical history, past social history, past surgical history and problem list       Review of Systems   Constitutional: Negative for activity change and appetite change  Respiratory: Negative for shortness of breath  Cardiovascular: Negative for chest pain  Musculoskeletal: Positive for arthralgias  Negative for myalgias  Elbow pain         Objective:      /64 (BP Location: Left arm, Patient Position: Sitting, Cuff Size: Large)   Pulse 57   Temp 97 9 °F (36 6 °C) (Temporal)   Resp 14   Ht 5' 11" (1 803 m)   Wt 91 6 kg (202 lb)   SpO2 97%   BMI 28 17 kg/m²          Physical Exam   Constitutional: He appears well-developed and well-nourished  Neck: No thyromegaly present  Cardiovascular: Normal rate, regular rhythm and normal heart sounds  Pulmonary/Chest: Breath sounds normal    Musculoskeletal: He exhibits no tenderness  Click in r elbow   Lymphadenopathy:     He has no cervical adenopathy  Vitals reviewed

## 2018-12-21 LAB
ANA SER QL IF: NEGATIVE
B BURGDOR AB SER IA-ACNC: <0.9 INDEX
BASOPHILS # BLD AUTO: 30 CELLS/UL (ref 0–200)
BASOPHILS NFR BLD AUTO: 1.1 %
CRP SERPL-MCNC: 0.2 MG/L
EOSINOPHIL # BLD AUTO: 41 CELLS/UL (ref 15–500)
EOSINOPHIL NFR BLD AUTO: 1.5 %
ERYTHROCYTE [DISTWIDTH] IN BLOOD BY AUTOMATED COUNT: 12.6 % (ref 11–15)
ERYTHROCYTE [SEDIMENTATION RATE] IN BLOOD BY WESTERGREN METHOD: 2 MM/H
HCT VFR BLD AUTO: 42 % (ref 38.5–50)
HGB BLD-MCNC: 14.4 G/DL (ref 13.2–17.1)
LYMPHOCYTES # BLD AUTO: 1528 CELLS/UL (ref 850–3900)
LYMPHOCYTES NFR BLD AUTO: 56.6 %
MCH RBC QN AUTO: 30.6 PG (ref 27–33)
MCHC RBC AUTO-ENTMCNC: 34.3 G/DL (ref 32–36)
MCV RBC AUTO: 89.4 FL (ref 80–100)
MONOCYTES # BLD AUTO: 221 CELLS/UL (ref 200–950)
MONOCYTES NFR BLD AUTO: 8.2 %
NEUTROPHILS # BLD AUTO: 880 CELLS/UL (ref 1500–7800)
NEUTROPHILS NFR BLD AUTO: 32.6 %
PLATELET # BLD AUTO: 262 THOUSAND/UL (ref 140–400)
PMV BLD REES-ECKER: 10.5 FL (ref 7.5–12.5)
RBC # BLD AUTO: 4.7 MILLION/UL (ref 4.2–5.8)
RHEUMATOID FACT SERPL-ACNC: <14 IU/ML
WBC # BLD AUTO: 2.7 THOUSAND/UL (ref 3.8–10.8)

## 2018-12-24 ENCOUNTER — TELEPHONE (OUTPATIENT)
Dept: FAMILY MEDICINE CLINIC | Facility: CLINIC | Age: 33
End: 2018-12-24

## 2018-12-24 DIAGNOSIS — M25.522 PAIN OF BOTH ELBOWS: ICD-10-CM

## 2018-12-24 DIAGNOSIS — D72.819 LEUKOPENIA, UNSPECIFIED TYPE: Primary | ICD-10-CM

## 2018-12-24 DIAGNOSIS — M25.521 PAIN OF BOTH ELBOWS: ICD-10-CM

## 2018-12-24 NOTE — TELEPHONE ENCOUNTER
Called Pt and left message regarding to Xray and labs  Pt labs as per Dr Mariann Shay is normal except for [facility] Blood cell are elevated, also the Mild arthritis both elbows  If you have questions please call us back   Kerry HURST

## 2018-12-24 NOTE — TELEPHONE ENCOUNTER
----- Message from Mila Jacob MD sent at 12/23/2018  8:15 PM EST -----    Mild arthritis both elbows

## 2019-01-03 ENCOUNTER — EVALUATION (OUTPATIENT)
Dept: PHYSICAL THERAPY | Facility: REHABILITATION | Age: 34
End: 2019-01-03
Payer: COMMERCIAL

## 2019-01-03 DIAGNOSIS — M25.522 PAIN OF BOTH ELBOWS: ICD-10-CM

## 2019-01-03 DIAGNOSIS — M25.521 PAIN OF BOTH ELBOWS: ICD-10-CM

## 2019-01-03 PROCEDURE — G8990 OTHER PT/OT CURRENT STATUS: HCPCS | Performed by: PHYSICAL THERAPIST

## 2019-01-03 PROCEDURE — 97161 PT EVAL LOW COMPLEX 20 MIN: CPT | Performed by: PHYSICAL THERAPIST

## 2019-01-03 PROCEDURE — 97110 THERAPEUTIC EXERCISES: CPT | Performed by: PHYSICAL THERAPIST

## 2019-01-03 PROCEDURE — G8991 OTHER PT/OT GOAL STATUS: HCPCS | Performed by: PHYSICAL THERAPIST

## 2019-01-03 NOTE — PROGRESS NOTES
PT Evaluation     Today's date: 1/3/2019  Patient name: Ashish Otero  : 1985  MRN: [id number]  Referring provider: Kp Tobias MD  Dx:   Encounter Diagnosis     ICD-10-CM    1  Pain of both elbows M25 521 Ambulatory referral to Physical Therapy    M25 522        Start Time: 1531  Stop Time: 0930  Total time in clinic (min): 58 minutes    Assessment  Assessment details: Pt is a 34year old male who presents to outpatient PT with a diagnosis of b/l elbow pain and arthritis  Pt demonstrates impairmentsvassociated with elbow arthritisvand triceps tendinitis including increased pain, decreased ROM, decreased strength and impaired functional mobility with difficulty pushing, lifting, and twisting/turning  Pt will benefit from skilled PT to address above impairments and maximize function  Impairments: abnormal or restricted ROM, activity intolerance, impaired physical strength, lacks appropriate home exercise program, pain with function, weight-bearing intolerance and poor posture     Symptom irritability: lowBarriers to therapy: Financial, pt only able to attend 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  Understanding of Dx/Px/POC: good   Prognosis: good    Goals  Impairment Goals  -Pt will demonstrate decreased pain to 4/10 at worst  -Pt will demonstrate painfree elbow ext passive ROM     Functional Goals  -Pt will demonstrate ability to lift work bag without increase in pain  -Pt will demonstrate ability to push up from a chair or bed without increase in pain        Plan  Plan details: Cont per POC  Assess compliance to HEP  Initiate IASTM NV    Patient would benefit from: skilled physical therapy  Planned modality interventions: thermotherapy: hydrocollator packs and cryotherapy  Planned therapy interventions: patient education, flexibility, functional ROM exercises, home exercise program, therapeutic exercise, therapeutic activities, stretching and strengthening  Frequency: 1x week  Duration in weeks: 6  Treatment plan discussed with: patient        Subjective Evaluation    History of Present Illness  Mechanism of injury: Pt reports he will contact PCP to inquire about treatment for depression and declined the national suicide prevention hotline number  Pt reports R>L elbow pain x 14 months  Pt reports he was doing pushups and felt a sharp pain in the right elbow  It continued to progress and worsen  In February his elbow gave out on him while lifting weights and he dropped a weight on his chest  He notices the pain with ADL's and certain movements will cause a sharp pain  He has left elbow pain that is more intermittent and decreased intensity  X-rays performed and (+) for arthritis b/l    L elbow, posterior: current: 0/10, best 0/10, worst 2/10, tingling in L  Pain  Current pain rating: 3 (right elbow)  At best pain ratin  At worst pain ratin  Pain location: posterior elbow   Quality: radiating and throbbing  Exacerbated by: twisting/turning motions, lifting, pushing, pushing up from bed or chair    Progression: worsening    Social Support  Steps to enter house: yes  14  Stairs in house: yes   Lives in: apartment  Lives with: spouse    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 )  Hand dominance: right      Diagnostic Tests  X-ray: abnormal  Treatments  Current treatment: physical therapy  Patient Goals  Patient goal: "get rid of pain"         Objective     Palpation     Additional Palpation Details  TTP distal triceps tendon on R     Active Range of Motion     Left Elbow   Flexion: 140 degrees   Extension: 0 degrees   Forearm supination: 75 degrees   Forearm pronation: 72 degrees     Right Elbow   Flexion: 138 degrees   Extension: 0 degrees with pain  Forearm supination: 57 degrees   Forearm pronation: 87 degrees with pain    Additional Active Range of Motion Details  Pain with end range elbow ext on R, pain with overpressure b/l elbows, hard end feel on R   End range elbow ext decreases after distraction     R wrist ext AROM: 73, flex 70 deg  L wrsit ext AROM: 80 deg, flex 72 deg    Strength/Myotome Testing     Left Elbow   Flexion: 4+  Extension: 4+  Forearm supination: 4+  Forearm pronation: 4+    Right Elbow   Flexion: 4+  Extension: 4  Forearm supination: 4+  Forearm pronation: 4    Additional Strength Details  Shoulder flex/abd/ER/IR MMT 4+/5 b/l   Pain with resisted elbow ext and forearm pronation on R   Resisted wrist ext: 4+/5 b/l, resisted wrist flex 4/5 b/l, pain with resisted wrist flex on R     L  strength: 97#,87#,73#  R  strength: 74#, 86#, 85#    Mid trap MMT: 4/5 R, 4-/5 L  Low trap MMT: 4/5 R, 4-/5 L      Flowsheet Rows      Most Recent Value   PT/OT G-Codes   Current Score  55   Projected Score  71   FOTO information reviewed  Yes   Assessment Type  Evaluation   G code set  Other PT/OT Primary   Other PT Primary Current Status ()  CK   Other PT Primary Goal Status ()  CJ          Precautions: depression     Daily Treatment Diary     Manual  1/3         Elbow distraction 2 mins         IASTM R>L distal triceps  nv                                           Exercise Diary  1/3         Doorway pec stretch          Overhead triceps stretch          Triceps ext towel resistance           Chair push up          Tb ext L3 1x5         No money L2 1x5         Tb horz abd L2 1x5         Shoulder flexion          Shoulder abd          Shoulder press          Prone scaption          Prone abd          Prone abd/er          Tb biceps curl          Tb triceps ext          Scap Retraction 10"x5         Wall pushups                        Modalities           mhp prn         ice prn

## 2019-01-04 ENCOUNTER — TELEPHONE (OUTPATIENT)
Dept: FAMILY MEDICINE CLINIC | Facility: CLINIC | Age: 34
End: 2019-01-04

## 2019-01-04 NOTE — TELEPHONE ENCOUNTER
Pt called stating you sent him to PT and it going to be too expensive to continue  Can you give him a cortisone shot instead?

## 2019-01-07 ENCOUNTER — OFFICE VISIT (OUTPATIENT)
Dept: PHYSICAL THERAPY | Facility: REHABILITATION | Age: 34
End: 2019-01-07
Payer: COMMERCIAL

## 2019-01-07 DIAGNOSIS — M25.522 PAIN OF BOTH ELBOWS: Primary | ICD-10-CM

## 2019-01-07 DIAGNOSIS — M25.521 PAIN OF BOTH ELBOWS: Primary | ICD-10-CM

## 2019-01-07 PROCEDURE — 97140 MANUAL THERAPY 1/> REGIONS: CPT | Performed by: PHYSICAL THERAPIST

## 2019-01-07 PROCEDURE — 97110 THERAPEUTIC EXERCISES: CPT | Performed by: PHYSICAL THERAPIST

## 2019-01-07 NOTE — PROGRESS NOTES
Daily Note     Today's date: 2019  Patient name: Kaushal Brumfield  : 1985  MRN: [id number]  Referring provider: Rudi Denton MD  Dx:   Encounter Diagnosis     ICD-10-CM    1  Pain of both elbows M25 521     M25 522        Start Time: 0900  Stop Time: 09  Total time in clinic (min): 35 minutes    Subjective: Pt rpeorts he has been doing his exercises 2x/day and icing his elbows  He has less R elbow pain at his triceps but noticed some discomfort in his wrist extensors with dumbbell presses at the gym  Objective: See treatment diary below    Precautions: depression     Daily Treatment Diary     Manual  1/3         Elbow distraction 2 mins         IASTM R>L distal triceps  nv                                           Exercise Diary  /3         Doorway pec stretch          Overhead  triceps stretch          Triceps ext towel resistance           Chair push up          Tb ext L3 1x5 L3 1x10        No money L2 1x5 L2 1x5        Tb horz abd L2 1x5 L2 1x5        Wrist ext with wt  2# 1x5        Wrist flex with wt  2# 1x5        Pron  sup with wt  2# 1x5 ea        Shoulder flexion          Shoulder abd  1x5 ea        Shoulder press  1x5 ea        Prone scaption  1x5 ea        Prone abd          Prone abd/er          Tb biceps curl          Tb triceps ext  35# 1x5, HEP        Scap Retraction 10"x5 10'x2        Wall pushups  1x5, HEP                      Modalities           mhp prn         ice prn                       Assessment: Tolerated treatment well  Patient exhibited good technique with therapeutic exercises  Pt experienced pain with wall pushups with elbows by side, with elbows/shoulder abducted, pt able to complete painfree  Pt experiences pain with overhead press and chest press, cued to avoid activity or decrease weight to avoid increase in symptoms  Initiated prone strengthening exercises to progress postural stabilization   Initiated IASTM over b/l distal triceps tendon, R>L to decrease pian and improve healing  Plan: Continue per plan of care  1/22/19:Pt called on 1/21/19 to cancel all future appts secondary to financial restrictions  Pt d/c'ed from skilled PT

## 2019-01-10 ENCOUNTER — PROCEDURE VISIT (OUTPATIENT)
Dept: FAMILY MEDICINE CLINIC | Facility: CLINIC | Age: 34
End: 2019-01-10
Payer: COMMERCIAL

## 2019-01-10 ENCOUNTER — DOCUMENTATION (OUTPATIENT)
Dept: FAMILY MEDICINE CLINIC | Facility: CLINIC | Age: 34
End: 2019-01-10

## 2019-01-10 VITALS
HEIGHT: 71 IN | RESPIRATION RATE: 18 BRPM | BODY MASS INDEX: 29.26 KG/M2 | SYSTOLIC BLOOD PRESSURE: 116 MMHG | WEIGHT: 209 LBS | DIASTOLIC BLOOD PRESSURE: 80 MMHG | HEART RATE: 56 BPM

## 2019-01-10 DIAGNOSIS — M25.521 RIGHT ELBOW PAIN: Primary | ICD-10-CM

## 2019-01-10 PROCEDURE — 20605 DRAIN/INJ JOINT/BURSA W/O US: CPT | Performed by: FAMILY MEDICINE

## 2019-01-10 NOTE — PROGRESS NOTES
Medium joint arthrocentesis  Date/Time: 1/10/2019 8:28 AM  Consent given by: patient  Site marked: site marked  Timeout: Immediately prior to procedure a time out was called to verify the correct patient, procedure, equipment, support staff and site/side marked as required   Supporting Documentation  Indications: pain   Procedure Details  Location: elbow - R elbow  Preparation: cleansed with iodine    Needle size: 22 G  Ultrasound guidance: no  Approach: anterolateral    Aspirate amount: 0 mL  Patient tolerance: patient tolerated the procedure well with no immediate complications  Dressing:  Sterile dressing applied      meds 1 cc lidocaine 1%  NDC # [address]-6  Lot# [id number] Exp 6/2021   Depomedrol 80 mg/ml NDC 5518-656-78   lot #[id number] Exp 08/19

## 2019-01-10 NOTE — LETTER
January 10, 2019     Patient: Rishabh Lopez   YOB: 1985   Date of Visit: 1/10/2019       To Whom it May Concern:    Rishabh Lopez is under my professional care  He was seen in my office on 1/10/2019  He may return to work on 1/11/19  If you have any questions or concerns, please don't hesitate to call           Sincerely,          Kiko Amaya MD        CC: No Recipients

## 2019-01-14 ENCOUNTER — APPOINTMENT (OUTPATIENT)
Dept: PHYSICAL THERAPY | Facility: REHABILITATION | Age: 34
End: 2019-01-14
Payer: COMMERCIAL

## 2019-01-15 RX ORDER — METHYLPREDNISOLONE ACETATE 80 MG/ML
80 INJECTION, SUSPENSION INTRA-ARTICULAR; INTRALESIONAL; INTRAMUSCULAR; SOFT TISSUE ONCE
Status: SHIPPED | OUTPATIENT
Start: 2019-01-15

## 2019-01-21 ENCOUNTER — APPOINTMENT (OUTPATIENT)
Dept: PHYSICAL THERAPY | Facility: REHABILITATION | Age: 34
End: 2019-01-21
Payer: COMMERCIAL

## 2019-01-28 ENCOUNTER — APPOINTMENT (OUTPATIENT)
Dept: PHYSICAL THERAPY | Facility: REHABILITATION | Age: 34
End: 2019-01-28
Payer: COMMERCIAL

## 2019-09-07 ENCOUNTER — OFFICE VISIT (OUTPATIENT)
Dept: URGENT CARE | Facility: MEDICAL CENTER | Age: 34
End: 2019-09-07
Payer: COMMERCIAL

## 2019-09-07 VITALS
BODY MASS INDEX: 27.47 KG/M2 | OXYGEN SATURATION: 99 % | TEMPERATURE: 97 F | DIASTOLIC BLOOD PRESSURE: 60 MMHG | SYSTOLIC BLOOD PRESSURE: 106 MMHG | HEART RATE: 60 BPM | RESPIRATION RATE: 16 BRPM | WEIGHT: 196.21 LBS | HEIGHT: 71 IN

## 2019-09-07 DIAGNOSIS — K64.4 INFLAMED EXTERNAL HEMORRHOID: Primary | ICD-10-CM

## 2019-09-07 PROCEDURE — G0381 LEV 2 HOSP TYPE B ED VISIT: HCPCS | Performed by: PHYSICIAN ASSISTANT

## 2019-09-07 NOTE — PROGRESS NOTES
[facility]        NAME: Dontae Fall is a 30 y o  male  : 1985    MRN: [id number]  DATE: 2019  TIME: 11:12 AM    Assessment and Plan   Inflamed external hemorrhoid [K64 4]  1  Inflamed external hemorrhoid  hydrocortisone-pramoxine (PROCTOFOAM-HC) rectal foam         Patient Instructions   Metamucil increase fluids and add fiber to diet  Follow up with PCP in 3-5 days  Proceed to  ER if symptoms worsen  Chief Complaint     Chief Complaint   Patient presents with    Rectal Bleeding     3 episodes of rectal bleeding after bowel movements x a few days  Pt states that pain is dark red and thick  Blood not in toliet, only on toliet paper  After having a bowel movemement pt c/o 8/10 rectal pain for about an hour  History of Present Illness       Patient has had several episodes over the last few days after a hard bowel movement  He has had bleeding on the toilet paper  His bowel movements can be uncomfortable  He complains of rectal discomfort after the bowel movement for about an hour  No previous problems  No other medical issues  Review of Systems   Review of Systems   All other systems reviewed and are negative  Current Medications       Current Outpatient Medications:     hydrocortisone-pramoxine (PROCTOFOAM-HC) rectal foam, Insert 1 applicator into the rectum 3 (three) times a day, Disp: 10 g, Rfl: 0    Current Facility-Administered Medications:     methylPREDNISolone acetate (DEPO-MEDROL) injection 80 mg, 80 mg, Intramuscular, Once, Joleen Oneil MD    Current Allergies     Allergies as of 2019    (No Known Allergies)            The following portions of the patient's history were reviewed and updated as appropriate: allergies, current medications, past family history, past medical history, past social history, past surgical history and problem list      History reviewed  No pertinent past medical history  History reviewed   No pertinent surgical history  Family History   Problem Relation Age of Onset    Heart disease Mother     Heart disease Maternal Grandfather          Medications have been verified  Objective   /60   Pulse 60   Temp (!) 97 °F (36 1 °C)   Resp 16   Ht 5' 11" (1 803 m)   Wt 89 kg (196 lb 3 4 oz)   SpO2 99%   BMI 27 37 kg/m²        Physical Exam     Physical Exam   Constitutional: He appears well-nourished  Cardiovascular: Normal rate, regular rhythm and normal heart sounds  Pulmonary/Chest: Effort normal and breath sounds normal    Abdominal: Soft  Bowel sounds are normal    Nursing note and vitals reviewed  Rectal external hemorrhoid at 3 and 9:00 a m   No active bleeding

## 2019-09-07 NOTE — PATIENT INSTRUCTIONS
Hemorrhoids   WHAT YOU NEED TO KNOW:   Hemorrhoids are swollen blood vessels inside your rectum (internal hemorrhoids) or on your anus (external hemorrhoids)  Sometimes a hemorrhoid may prolapse  This means it extends out of your anus  DISCHARGE INSTRUCTIONS:   Return to the emergency department if:   · You have severe pain in your rectum or around your anus  · You have severe pain in your abdomen and you are vomiting  · You have bleeding from your anus that soaks through your underwear  Contact your healthcare provider if:   · You have frequent and painful bowel movements  · Your hemorrhoid looks or feels more swollen than usual      · You do not have a bowel movement for 2 days or more  · You see or feel tissue coming through your anus  · You have questions or concerns about your condition or care  Medicines: You may  need any of the following:  · A pad, cream, or ointment  can help decrease pain, swelling, and itching  · Stool softeners  help treat or prevent constipation  · NSAIDs , such as ibuprofen, help decrease swelling, pain, and fever  NSAIDs can cause stomach bleeding or kidney problems in certain people  If you take blood thinner medicine, always ask your healthcare provider if NSAIDs are safe for you  Always read the medicine label and follow directions  · Take your medicine as directed  Contact your healthcare provider if you think your medicine is not helping or if you have side effects  Tell him or her if you are allergic to any medicine  Keep a list of the medicines, vitamins, and herbs you take  Include the amounts, and when and why you take them  Bring the list or the pill bottles to follow-up visits  Carry your medicine list with you in case of an emergency  Manage your symptoms:   · Apply ice on your anus for 15 to 20 minutes every hour or as directed  Use an ice pack, or put crushed ice in a plastic bag   Cover it with a towel before you apply it to your anus  Ice helps prevent tissue damage and decreases swelling and pain  · Take a sitz bath  Fill a bathtub with 4 to 6 inches of warm water  You may also use a sitz bath pan that fits inside a toilet bowl  Sit in the sitz bath for 15 minutes  Do this 3 times a day, and after each bowel movement  The warm water can help decrease pain and swelling  · Keep your anal area clean  Gently wash the area with warm water daily  Soap may irritate the area  After a bowel movement, wipe with moist towelettes or wet toilet paper  Dry toilet paper can irritate the area  Prevent hemorrhoids:   · Do not strain to have a bowel movement  Do not sit on the toilet too long  These actions can increase pressure on the tissues in your rectum and anus  · Drink plenty of liquids  Liquids can help prevent constipation  Ask how much liquid to drink each day and which liquids are best for you  · Eat a variety of high-fiber foods  Examples include fruits, vegetables, and whole grains  Ask your healthcare provider how much fiber you need each day  You may need to take a fiber supplement  · Exercise as directed  Exercise, such as walking, may make it easier to have a bowel movement  Ask your healthcare provider to help you create an exercise plan  · Do not have anal sex  Anal sex can weaken the skin around your rectum and anus  · Avoid heavy lifting  This can cause straining and increase your risk for another hemorrhoid  Follow up with your healthcare provider as directed:  Write down your questions so you remember to ask them during your visits  © 2017 [facility] Information is for End User's use only and may not be sold, redistributed or otherwise used for commercial purposes  All illustrations and images included in CareNotes® are the copyrighted property of [employer] , [employer]  or [employer]agan  The above information is an  only   It is not intended as medical advice for individual conditions or treatments  Talk to your doctor, nurse or pharmacist before following any medical regimen to see if it is safe and effective for you

## 2020-02-10 ENCOUNTER — OFFICE VISIT (OUTPATIENT)
Dept: FAMILY MEDICINE CLINIC | Facility: CLINIC | Age: 35
End: 2020-02-10
Payer: COMMERCIAL

## 2020-02-10 VITALS
DIASTOLIC BLOOD PRESSURE: 60 MMHG | SYSTOLIC BLOOD PRESSURE: 104 MMHG | HEIGHT: 71 IN | RESPIRATION RATE: 18 BRPM | WEIGHT: 194.2 LBS | BODY MASS INDEX: 27.19 KG/M2 | TEMPERATURE: 97.6 F | HEART RATE: 56 BPM

## 2020-02-10 DIAGNOSIS — M54.50 ACUTE RIGHT-SIDED LOW BACK PAIN WITHOUT SCIATICA: Primary | ICD-10-CM

## 2020-02-10 PROCEDURE — 99214 OFFICE O/P EST MOD 30 MIN: CPT | Performed by: PHYSICIAN ASSISTANT

## 2020-02-10 PROCEDURE — 1036F TOBACCO NON-USER: CPT | Performed by: PHYSICIAN ASSISTANT

## 2020-02-10 PROCEDURE — 3008F BODY MASS INDEX DOCD: CPT | Performed by: PHYSICIAN ASSISTANT

## 2020-02-10 RX ORDER — TIZANIDINE 4 MG/1
4 TABLET ORAL EVERY 8 HOURS PRN
Qty: 30 TABLET | Refills: 0 | Status: SHIPPED | OUTPATIENT
Start: 2020-02-10

## 2020-02-10 RX ORDER — DICLOFENAC SODIUM 75 MG/1
75 TABLET, DELAYED RELEASE ORAL 2 TIMES DAILY
Qty: 30 TABLET | Refills: 0 | Status: SHIPPED | OUTPATIENT
Start: 2020-02-10

## 2020-02-10 NOTE — PATIENT INSTRUCTIONS
1   Acute right lower back pain -localized without sign of radiculopathy  Would recommend continued supportive care with diclofenac twice daily for inflammation with food and tizanidine 4 milligrams at bedtime as needed for muscle spasm  Patient also advised to try moist heat to the area    If symptoms are not improving in the next week consider further physical therapy and evaluate as necessary

## 2020-02-10 NOTE — PROGRESS NOTES
Assessment and Plan:    Patient Instructions     1  Acute right lower back pain -localized without sign of radiculopathy  Would recommend continued supportive care with diclofenac twice daily for inflammation with food and tizanidine 4 milligrams at bedtime as needed for muscle spasm  Patient also advised to try moist heat to the area  If symptoms are not improving in the next week consider further physical therapy and evaluate as necessary         Diagnoses and all orders for this visit:    Acute right-sided low back pain without sciatica  -     diclofenac (VOLTAREN) 75 mg EC tablet; Take 1 tablet (75 mg total) by mouth 2 (two) times a day  -     tiZANidine (ZANAFLEX) 4 mg tablet; Take 1 tablet (4 mg total) by mouth every 8 (eight) hours as needed for muscle spasms              Subjective:      Patient ID: Ciara Neri is a 30 y o  male  CC:    Chief Complaint   Patient presents with    Back Pain     pt says he back went out on Friday, he couldn't walk Friday and Saturday and today it is just pain       HPI:      HPI:  This is a 72-year-old gentleman that presents to the office with pain in his lower right back which started last Friday  He states that he was doing dead lifts and felt a little twinge of pain but continued to go and then he had a sudden sharp pain that did not let up  He felt that he had trouble walking for 2 days afterward  He feels that he can walk now but it is still painful in that local area  He has not had any radiculopathy and denies any numbness or weakness of the legs  He has tried some ice to the area as well as anti-inflammatories over-the-counter with little benefit so far  He has not had a history of back problems previously        The following portions of the patient's history were reviewed and updated as appropriate: allergies, current medications, past family history, past medical history, past social history, past surgical history and problem list       Review of Systems   Constitutional: Negative for fever  HENT: Negative for congestion  Respiratory: Negative for cough, chest tightness and shortness of breath  Cardiovascular: Negative for chest pain  Musculoskeletal: Positive for arthralgias and back pain  Data to review:       Objective:    Vitals:    02/10/20 1359   BP: 104/60   BP Location: Left arm   Patient Position: Sitting   Cuff Size: Adult   Pulse: 56   Resp: 18   Temp: 97 6 °F (36 4 °C)   TempSrc: Temporal   Weight: 88 1 kg (194 lb 3 2 oz)   Height: 5' 11" (1 803 m)        Physical Exam   Constitutional: He is oriented to person, place, and time  He appears well-developed and well-nourished  HENT:   Head: Normocephalic  Cardiovascular: Normal rate and regular rhythm  Pulmonary/Chest: Effort normal and breath sounds normal  No respiratory distress  Abdominal: Soft  Musculoskeletal: Normal range of motion  Patient has full range of motion with the lumbar spine however there is tenderness with full flexion and extension  Also pain with right lateral flexion  There is no point tenderness to palpation  Negative straight leg raise  Neurological: He is alert and oriented to person, place, and time  Psychiatric: He has a normal mood and affect  BMI Counseling: Body mass index is 27 09 kg/m²  The BMI is above normal  Nutrition recommendations include reducing portion sizes

## 2021-05-02 ENCOUNTER — OFFICE VISIT (OUTPATIENT)
Dept: URGENT CARE | Facility: MEDICAL CENTER | Age: 36
End: 2021-05-02
Payer: COMMERCIAL

## 2021-05-02 VITALS — HEART RATE: 72 BPM | OXYGEN SATURATION: 97 % | RESPIRATION RATE: 16 BRPM | TEMPERATURE: 97.7 F

## 2021-05-02 DIAGNOSIS — H69.83 EUSTACHIAN TUBE DYSFUNCTION, BILATERAL: Primary | ICD-10-CM

## 2021-05-02 PROCEDURE — G0382 LEV 3 HOSP TYPE B ED VISIT: HCPCS | Performed by: PHYSICIAN ASSISTANT

## 2021-05-02 RX ORDER — FLUTICASONE PROPIONATE 50 MCG
2 SPRAY, SUSPENSION (ML) NASAL DAILY
Qty: 16 G | Refills: 0 | Status: SHIPPED | OUTPATIENT
Start: 2021-05-02

## 2021-05-02 RX ORDER — PREDNISONE 10 MG/1
TABLET ORAL
Qty: 18 TABLET | Refills: 0 | Status: SHIPPED | OUTPATIENT
Start: 2021-05-02

## 2021-05-02 NOTE — PROGRESS NOTES
[facility]        NAME: Adia Osman is a 31 y o  male  : 1985    MRN: [id number]  DATE: May 2, 2021  TIME: 12:06 PM    Assessment and Plan   Eustachian tube dysfunction, bilateral [H69 83]  1  Eustachian tube dysfunction, bilateral  predniSONE 10 mg tablet    fluticasone (FLONASE) 50 mcg/act nasal spray         Patient Instructions       Follow up with PCP in 3-5 days  increase fluids  Chief Complaint     Chief Complaint   Patient presents with    Allergies     Patient presents with cough, congestion, stuffy nose, and soreness on his right tonsils and right sided ear pain that started last weekend  He is taking allergy meds for his symptoms  He denies exposure to covid  He was tested for covid this morning at [facility]           History of Present Illness        Patient had a COVID test [facility] was negative last week and had a repeat today  He has not been exposed as far as he knows  He has history of seasonal allergies and takes Claritin daily  His allergy symptoms have greatly increased over the last few weeks  Sinusitis  This is a new problem  The current episode started in the past 7 days  The problem has been gradually worsening since onset  There has been no fever  He is experiencing no pain  Associated symptoms include congestion, coughing (  Drawing nonproductive), ear pain ( bilateral), sinus pressure and a sore throat  Pertinent negatives include no chills, diaphoresis, headaches, hoarse voice, neck pain, shortness of breath, sneezing or swollen glands  Past treatments include nothing  Review of Systems   Review of Systems   Constitutional: Negative for chills and diaphoresis  HENT: Positive for congestion, ear pain ( bilateral), sinus pressure and sore throat  Negative for hoarse voice and sneezing  Respiratory: Positive for cough (  Drawing nonproductive)  Negative for shortness of breath  Musculoskeletal: Negative for neck pain  Neurological: Negative for headaches  All other systems reviewed and are negative  Current Medications       Current Outpatient Medications:     diclofenac (VOLTAREN) 75 mg EC tablet, Take 1 tablet (75 mg total) by mouth 2 (two) times a day (Patient not taking: Reported on 5/2/2021), Disp: 30 tablet, Rfl: 0    fluticasone (FLONASE) 50 mcg/act nasal spray, 2 sprays into each nostril daily, Disp: 16 g, Rfl: 0    predniSONE 10 mg tablet, 4 x 3 days, 3 x 1, 2 x 1, 1 x 1, Disp: 18 tablet, Rfl: 0    tiZANidine (ZANAFLEX) 4 mg tablet, Take 1 tablet (4 mg total) by mouth every 8 (eight) hours as needed for muscle spasms (Patient not taking: Reported on 5/2/2021), Disp: 30 tablet, Rfl: 0    Current Facility-Administered Medications:     methylPREDNISolone acetate (DEPO-MEDROL) injection 80 mg, 80 mg, Intramuscular, Once, Simin Mazariegos MD    Current Allergies     Allergies as of 05/02/2021    (No Known Allergies)            The following portions of the patient's history were reviewed and updated as appropriate: allergies, current medications, past family history, past medical history, past social history, past surgical history and problem list      History reviewed  No pertinent past medical history  History reviewed  No pertinent surgical history  Family History   Problem Relation Age of Onset    Heart disease Mother     Heart disease Maternal Grandfather          Medications have been verified  Objective   Pulse 72   Temp 97 7 °F (36 5 °C) (Tympanic)   Resp 16   SpO2 97%   No LMP for male patient  Physical Exam     Physical Exam  Vitals signs and nursing note reviewed  Constitutional:       Appearance: Normal appearance  He is normal weight  Cardiovascular:      Rate and Rhythm: Normal rate and regular rhythm  Pulses: Normal pulses  Heart sounds: Normal heart sounds  Pulmonary:      Effort: Pulmonary effort is normal       Breath sounds: Normal breath sounds  Neurological:      Mental Status: He is alert

## 2021-05-02 NOTE — PATIENT INSTRUCTIONS
Eustachian Tube Dysfunction   AMBULATORY CARE:   Eustachian tube dysfunction (ETD)  is a condition that prevents your eustachian tubes from opening properly  It can also cause them to become blocked  Eustachian tubes connect your middle ear to the back of your nose and throat  These tubes open and allow air to flow in and out when you sneeze, swallow, or yawn  Common signs and symptoms include the following:   · Fullness or pressure in your ears    · Muffled hearing, or a feeling you are hearing under water or have clogged ears    · Pain in one or both ears    · Ringing in your ears    · Popping, crackling, or clicking feeling in your ears    · Trouble keeping your balance    Call your doctor or otolaryngologist if:   · Your symptoms do not improve or get worse  · You have a fever  · You have any hearing loss  · You have questions or concerns about your condition or care  Treatment:  ETD may get better on its own without any treatment  If it continues, you may need any of the following:  · Swallow, yawn, or chew gum  to help open your eustachian tubes  Your healthcare provider may also recommend you blow with your mouth shut and your nostrils pinched closed  · Air pressure devices  push air into your nose and eustachian tubes to help relieve air pressure in your ear  · Treatment for allergies  such as decongestants, antihistamines, and nasal steroids may improve ETD  They may help decrease swelling of the eustachian tubes  · A myringotomy  is surgery to make a hole in your eardrum  The hole relieves pressure and lets fluid drain from your ear  A pressure equalizing (PE) tube may be used to keep the hole open and to help drain fluid  · Tuboplasty  is a procedure to widen your eustachian tubes  Follow up with your doctor or otolaryngologist as directed:  Write down your questions so you remember to ask them during your visits    © Copyright [employer] 2020 Information is for End User's use only and may not be sold, redistributed or otherwise used for commercial purposes  All illustrations and images included in CareNotes® are the copyrighted property of A D [employer] M [employer]  or [employer]  The above information is an  only  It is not intended as medical advice for individual conditions or treatments  Talk to your doctor, nurse or pharmacist before following any medical regimen to see if it is safe and effective for you

## 2021-05-04 ENCOUNTER — TELEMEDICINE (OUTPATIENT)
Dept: FAMILY MEDICINE CLINIC | Facility: CLINIC | Age: 36
End: 2021-05-04
Payer: COMMERCIAL

## 2021-05-04 DIAGNOSIS — U07.1 COVID-19 VIRUS INFECTION: Primary | ICD-10-CM

## 2021-05-04 PROCEDURE — 99214 OFFICE O/P EST MOD 30 MIN: CPT | Performed by: NURSE PRACTITIONER

## 2021-05-04 RX ORDER — DEXTROMETHORPHAN HYDROBROMIDE AND PROMETHAZINE HYDROCHLORIDE 15; 6.25 MG/5ML; MG/5ML
2.5 SOLUTION ORAL 4 TIMES DAILY PRN
Qty: 118 ML | Refills: 0 | Status: SHIPPED | OUTPATIENT
Start: 2021-05-04 | End: 2021-05-10 | Stop reason: SDUPTHER

## 2021-05-04 RX ORDER — ALBUTEROL SULFATE 90 UG/1
2 AEROSOL, METERED RESPIRATORY (INHALATION) EVERY 6 HOURS PRN
Qty: 8 G | Refills: 0 | Status: SHIPPED | OUTPATIENT
Start: 2021-05-04 | End: 2021-05-10 | Stop reason: SDUPTHER

## 2021-05-04 NOTE — PATIENT INSTRUCTIONS
Problem List Items Addressed This Visit        Other    COVID-19 virus infection - Primary     4May:  Albuterol inhaler ordered to be used as needed for shortness of breath  Phenergan DM ordered to be used as needed for cough  Patient also advised to begin Mucinex every 12 hours to help with phlegm expectoration  Patient also told to increase his water intake  Will follow up with patient again on 05/05/2021  Relevant Medications    albuterol (PROVENTIL HFA,VENTOLIN HFA) 90 mcg/act inhaler    Promethazine-DM (PHENERGAN-DM) 6 25-15 mg/5 mL oral syrup        COVID-19 Home Care Guidelines    Your healthcare provider and/or public health staff have evaluated you and have determined that you do not need to remain in the hospital at this time  At this time you can be isolated at home where you will be monitored by staff from your local or state health department  You should carefully follow the prevention and isolation steps below until a healthcare provider or local or state health department says that you can return to your normal activities  Stay home except to get medical care    People who are mildly ill with COVID-19 are able to isolate at home during their illness  You should restrict activities outside your home, except for getting medical care  Do not go to work, school, or public areas  Avoid using public transportation, ride-sharing, or taxis  Separate yourself from other people and animals in your home    People: As much as possible, you should stay in a specific room and away from other people in your home  Also, you should use a separate bathroom, if available  Animals: You should restrict contact with pets and other animals while you are sick with COVID-19, just like you would around other people   Although there have not been reports of pets or other animals becoming sick with COVID-19, it is still recommended that people sick with COVID-19 limit contact with animals until more information is known about the virus  When possible, have another member of your household care for your animals while you are sick  If you are sick with COVID-19, avoid contact with your pet, including petting, snuggling, being kissed or licked, and sharing food  If you must care for your pet or be around animals while you are sick, wash your hands before and after you interact with pets and wear a facemask  See COVID-19 and Animals for more information  Call ahead before visiting your doctor    If you have a medical appointment, call the healthcare provider and tell them that you have or may have COVID-19  This will help the healthcare providers office take steps to keep other people from getting infected or exposed  Wear a facemask    You should wear a facemask when you are around other people (e g , sharing a room or vehicle) or pets and before you enter a healthcare providers office  If you are not able to wear a facemask (for example, because it causes trouble breathing), then people who live with you should not stay in the same room with you, or they should wear a facemask if they enter your room  Cover your coughs and sneezes    Cover your mouth and nose with a tissue when you cough or sneeze  Throw used tissues in a lined trash can  Immediately wash your hands with soap and water for at least 20 seconds or, if soap and water are not available, clean your hands with an alcohol-based hand  that contains at least 60% alcohol  Clean your hands often    Wash your hands often with soap and water for at least 20 seconds, especially after blowing your nose, coughing, or sneezing; going to the bathroom; and before eating or preparing food  If soap and water are not readily available, use an alcohol-based hand  with at least 60% alcohol, covering all surfaces of your hands and rubbing them together until they feel dry  Soap and water are the best option if hands are visibly dirty  Avoid touching your eyes, nose, and mouth with unwashed hands  Avoid sharing personal household items    You should not share dishes, drinking glasses, cups, eating utensils, towels, or bedding with other people or pets in your home  After using these items, they should be washed thoroughly with soap and water  Clean all high-touch surfaces everyday    High touch surfaces include counters, tabletops, doorknobs, bathroom fixtures, toilets, phones, keyboards, tablets, and bedside tables  Also, clean any surfaces that may have blood, stool, or body fluids on them  Use a household cleaning spray or wipe, according to the label instructions  Labels contain instructions for safe and effective use of the cleaning product including precautions you should take when applying the product, such as wearing gloves and making sure you have good ventilation during use of the product  Monitor your symptoms    Seek prompt medical attention if your illness is worsening (e g , difficulty breathing)  Before seeking care, call your healthcare provider and tell them that you have, or are being evaluated for, COVID-19  Put on a facemask before you enter the facility  These steps will help the healthcare providers office to keep other people in the office or waiting room from getting infected or exposed  Ask your healthcare provider to call the local or state health department  Persons who are placed under active monitoring or facilitated self-monitoring should follow instructions provided by their local health department or occupational health professionals, as appropriate  If you have a medical emergency and need to call 911, notify the dispatch personnel that you have, or are being evaluated for COVID-19  If possible, put on a facemask before emergency medical services arrive      Discontinuing home isolation    Patients with confirmed COVID-19 should remain under home isolation precautions until the following conditions are met:   - They have had no fever for at least 24 hours (that is one full day of no fever without the use medicine that reduces fevers)  AND  - other symptoms have improved (for example, when their cough or shortness of breath have improved)  AND  - If had mild or moderate illness, at least 10 days have passed since their symptoms first appeared or if severe illness (needed oxygen) or immunosuppressed, at least 20 days have passed since symptoms first appeared  Patients with confirmed COVID-19 should also notify close contacts (including their workplace) and ask that they self-quarantine  Currently, close contact is defined as being within 6 feet for 15 minutes or more from the period 24 hours starting 48 hours before symptom onset to the time at which the patient went into isolation  Close contacts of patients diagnosed with COVID-19 should be instructed by the patient to self-quarantine for 14 days from the last time of their last contact with the patient       Source: [employer]

## 2021-05-04 NOTE — PROGRESS NOTES
COVID-19 Outpatient Progress Note    Assessment/Plan:    Problem List Items Addressed This Visit        Other    COVID-19 virus infection - Primary     4May:  Albuterol inhaler ordered to be used as needed for shortness of breath  Phenergan DM ordered to be used as needed for cough  Patient also advised to begin Mucinex every 12 hours to help with phlegm expectoration  Patient also told to increase his water intake  Will follow up with patient again on 05/05/2021  Relevant Medications    albuterol (PROVENTIL HFA,VENTOLIN HFA) 90 mcg/act inhaler    Promethazine-DM (PHENERGAN-DM) 6 25-15 mg/5 mL oral syrup         Disposition:     Patient already tested positive for COVID  I have spent 15 minutes directly with the patient  Encounter provider DILMA Rucker    Provider located at [address] [place] [address] PRIMARY CARE  [facility] [id number]    Recent Visits  No visits were found meeting these conditions  Showing recent visits within past 7 days and meeting all other requirements     Today's Visits  Date Type Provider Dept   05/04/21 Telemedicine Deanna Stewart [facility] Primary Care   Showing today's visits and meeting all other requirements     Future Appointments  No visits were found meeting these conditions  Showing future appointments within next 150 days and meeting all other requirements      This virtual check-in was done via [employer] Du[employer] and patient was informed that this is not a secure, HIPAA-compliant platform  He agrees to proceed  Patient agrees to participate in a virtual check in via telephone or video visit instead of presenting to the office to address urgent/immediate medical needs  Patient is aware this is a billable service  After connecting through [place] [facility], the patient was identified by name and date of birth   Dennis Sanchez was informed that this was a telemedicine visit and that the exam was being conducted confidentially over secure lines  My office door was closed  No one else was in the room  Sommer Lee acknowledged consent and understanding of privacy and security of the telemedicine visit  I informed the patient that I have reviewed his record in Epic and presented the opportunity for him to ask any questions regarding the visit today  The patient agreed to participate  Subjective:   Sommer Lee is a 31 y o  male who is concerned about COVID-19  Patient's symptoms include fatigue, malaise, nasal congestion, rhinorrhea, sore throat, anosmia, cough (productive ), shortness of breath and chest tightness  Patient denies fever, chills, loss of taste, abdominal pain, nausea, vomiting, diarrhea, myalgias and headaches  Date of symptom onset: 4/24/2021    Exposure:   Contact with a person who is under investigation (PUI) for or who is positive for COVID-19 within the last 14 days?: No    Hospitalized recently for fever and/or lower respiratory symptoms?: No      Currently a healthcare worker that is involved in direct patient care?: No      Works in a special setting where the risk of COVID-19 transmission may be high? (this may include long-term care, correctional and long term facilities; homeless shelters; assisted-living facilities and group homes ): No      Resident in a special setting where the risk of COVID-19 transmission may be high? (this may include long-term care, correctional and long term facilities; homeless shelters; assisted-living facilities and group homes ): No      Patient received his positive COVID result today  The patient is currently reporting symptoms of productive cough, rhinorrhea, nasal congestion, sore throat, loss of smell, and increased fatigue  Patient is also reporting shortness of breath that is quickly relieved with rest   Patient denies any fevers  Patient was seen in the Care now on 05/02 for his symptoms and started on Flonase and a prednisone taper      Lab Results   Component Value Date    SARSCOV2 Positive (A) 05/02/2021     No past medical history on file  No past surgical history on file  Current Outpatient Medications   Medication Sig Dispense Refill    albuterol (PROVENTIL HFA,VENTOLIN HFA) 90 mcg/act inhaler Inhale 2 puffs every 6 (six) hours as needed for wheezing or shortness of breath 8 g 0    diclofenac (VOLTAREN) 75 mg EC tablet Take 1 tablet (75 mg total) by mouth 2 (two) times a day (Patient not taking: Reported on 5/2/2021) 30 tablet 0    fluticasone (FLONASE) 50 mcg/act nasal spray 2 sprays into each nostril daily 16 g 0    predniSONE 10 mg tablet 4 x 3 days, 3 x 1, 2 x 1, 1 x 1 18 tablet 0    Promethazine-DM (PHENERGAN-DM) 6 25-15 mg/5 mL oral syrup Take 2 5 mL by mouth 4 (four) times a day as needed for cough 118 mL 0    tiZANidine (ZANAFLEX) 4 mg tablet Take 1 tablet (4 mg total) by mouth every 8 (eight) hours as needed for muscle spasms (Patient not taking: Reported on 5/2/2021) 30 tablet 0     Current Facility-Administered Medications   Medication Dose Route Frequency Provider Last Rate Last Admin    methylPREDNISolone acetate (DEPO-MEDROL) injection 80 mg  80 mg Intramuscular Once Jesi Schuler MD         No Known Allergies    Review of Systems   Constitutional: Positive for fatigue  Negative for chills and fever  HENT: Positive for congestion, rhinorrhea and sore throat  Eyes: Negative  Respiratory: Positive for cough (productive ), chest tightness and shortness of breath  Cardiovascular: Negative for chest pain and palpitations  Gastrointestinal: Negative for abdominal pain, diarrhea, nausea and vomiting  Endocrine: Negative  Genitourinary: Negative  Musculoskeletal: Negative for myalgias  Skin: Negative  Allergic/Immunologic: Negative  Neurological: Negative for headaches  Hematological: Negative  Psychiatric/Behavioral: Negative  Objective: There were no vitals filed for this visit      Physical Exam  Vitals reviewed: limited due to Google Duo exam    Constitutional:       General: He is not in acute distress  Appearance: Normal appearance  He is not ill-appearing  Neurological:      Mental Status: He is alert  VIRTUAL VISIT DISCLAIMER    Kaushal Brumfield acknowledges that he has consented to an online visit or consultation  He understands that the online visit is based solely on information provided by him, and that, in the absence of a face-to-face physical evaluation by the physician, the diagnosis he receives is both limited and provisional in terms of accuracy and completeness  This is not intended to replace a full medical face-to-face evaluation by the physician  Kaushal Brumfield understands and accepts these terms

## 2021-05-04 NOTE — ASSESSMENT & PLAN NOTE
4May:  Albuterol inhaler ordered to be used as needed for shortness of breath  Phenergan DM ordered to be used as needed for cough  Patient also advised to begin Mucinex every 12 hours to help with phlegm expectoration  Patient also told to increase his water intake  Will follow up with patient again on 05/05/2021

## 2021-05-05 ENCOUNTER — TELEMEDICINE (OUTPATIENT)
Dept: FAMILY MEDICINE CLINIC | Facility: CLINIC | Age: 36
End: 2021-05-05
Payer: COMMERCIAL

## 2021-05-05 DIAGNOSIS — U07.1 COVID-19 VIRUS INFECTION: Primary | ICD-10-CM

## 2021-05-05 PROCEDURE — 99214 OFFICE O/P EST MOD 30 MIN: CPT | Performed by: NURSE PRACTITIONER

## 2021-05-05 NOTE — ASSESSMENT & PLAN NOTE
5May: Day 11  Patient does report his shortness of breath has improved somewhat since beginning albuterol inhaler however, he still reports persistent dyspnea on exertion  Patient does report his cough has improved since beginning Phenergan DM  Patient is still reporting nasal congestion despite daily Flonase use and Prednisone taper use  Pulmicort ordered to help with SOB  Patient reports he will begin taking Mucinex today to help with sputum expectoration  Will follow-up with patient again on 05/06/2021

## 2021-05-05 NOTE — PATIENT INSTRUCTIONS
Problem List Items Addressed This Visit        Other    COVID-19 virus infection - Primary     11May: Day 6  Patient does report his shortness of breath has improved somewhat since beginning albuterol inhaler however, he still reports persistent dyspnea on exertion  Patient does report his cough has improved since beginning Phenergan DM  Patient is still reporting nasal congestion despite daily Flonase use and Prednisone taper use  Pulmicort ordered to help with SOB  Patient reports he will begin taking Mucinex today to help with sputum expectoration  Will follow-up with patient again on 05/06/2021  Relevant Medications    budesonide (PULMICORT) 90 MCG/ACT inhaler        COVID-19 Home Care Guidelines    Your healthcare provider and/or public health staff have evaluated you and have determined that you do not need to remain in the hospital at this time  At this time you can be isolated at home where you will be monitored by staff from your local or state health department  You should carefully follow the prevention and isolation steps below until a healthcare provider or local or state health department says that you can return to your normal activities  Stay home except to get medical care    People who are mildly ill with COVID-19 are able to isolate at home during their illness  You should restrict activities outside your home, except for getting medical care  Do not go to work, school, or public areas  Avoid using public transportation, ride-sharing, or taxis  Separate yourself from other people and animals in your home    People: As much as possible, you should stay in a specific room and away from other people in your home  Also, you should use a separate bathroom, if available  Animals: You should restrict contact with pets and other animals while you are sick with COVID-19, just like you would around other people   Although there have not been reports of pets or other animals becoming sick with COVID-19, it is still recommended that people sick with COVID-19 limit contact with animals until more information is known about the virus  When possible, have another member of your household care for your animals while you are sick  If you are sick with COVID-19, avoid contact with your pet, including petting, snuggling, being kissed or licked, and sharing food  If you must care for your pet or be around animals while you are sick, wash your hands before and after you interact with pets and wear a facemask  See COVID-19 and Animals for more information  Call ahead before visiting your doctor    If you have a medical appointment, call the healthcare provider and tell them that you have or may have COVID-19  This will help the healthcare providers office take steps to keep other people from getting infected or exposed  Wear a facemask    You should wear a facemask when you are around other people (e g , sharing a room or vehicle) or pets and before you enter a healthcare providers office  If you are not able to wear a facemask (for example, because it causes trouble breathing), then people who live with you should not stay in the same room with you, or they should wear a facemask if they enter your room  Cover your coughs and sneezes    Cover your mouth and nose with a tissue when you cough or sneeze  Throw used tissues in a lined trash can  Immediately wash your hands with soap and water for at least 20 seconds or, if soap and water are not available, clean your hands with an alcohol-based hand  that contains at least 60% alcohol  Clean your hands often    Wash your hands often with soap and water for at least 20 seconds, especially after blowing your nose, coughing, or sneezing; going to the bathroom; and before eating or preparing food   If soap and water are not readily available, use an alcohol-based hand  with at least 60% alcohol, covering all surfaces of your hands and rubbing them together until they feel dry  Soap and water are the best option if hands are visibly dirty  Avoid touching your eyes, nose, and mouth with unwashed hands  Avoid sharing personal household items    You should not share dishes, drinking glasses, cups, eating utensils, towels, or bedding with other people or pets in your home  After using these items, they should be washed thoroughly with soap and water  Clean all high-touch surfaces everyday    High touch surfaces include counters, tabletops, doorknobs, bathroom fixtures, toilets, phones, keyboards, tablets, and bedside tables  Also, clean any surfaces that may have blood, stool, or body fluids on them  Use a household cleaning spray or wipe, according to the label instructions  Labels contain instructions for safe and effective use of the cleaning product including precautions you should take when applying the product, such as wearing gloves and making sure you have good ventilation during use of the product  Monitor your symptoms    Seek prompt medical attention if your illness is worsening (e g , difficulty breathing)  Before seeking care, call your healthcare provider and tell them that you have, or are being evaluated for, COVID-19  Put on a facemask before you enter the facility  These steps will help the healthcare providers office to keep other people in the office or waiting room from getting infected or exposed  Ask your healthcare provider to call the local or state health department  Persons who are placed under active monitoring or facilitated self-monitoring should follow instructions provided by their local health department or occupational health professionals, as appropriate  If you have a medical emergency and need to call 911, notify the dispatch personnel that you have, or are being evaluated for COVID-19  If possible, put on a facemask before emergency medical services arrive      Discontinuing home isolation    Patients with confirmed COVID-19 should remain under home isolation precautions until the following conditions are met:   - They have had no fever for at least 24 hours (that is one full day of no fever without the use medicine that reduces fevers)  AND  - other symptoms have improved (for example, when their cough or shortness of breath have improved)  AND  - If had mild or moderate illness, at least 10 days have passed since their symptoms first appeared or if severe illness (needed oxygen) or immunosuppressed, at least 20 days have passed since symptoms first appeared  Patients with confirmed COVID-19 should also notify close contacts (including their workplace) and ask that they self-quarantine  Currently, close contact is defined as being within 6 feet for 15 minutes or more from the period 24 hours starting 48 hours before symptom onset to the time at which the patient went into isolation  Close contacts of patients diagnosed with COVID-19 should be instructed by the patient to self-quarantine for 14 days from the last time of their last contact with the patient       Source: [employer]

## 2021-05-05 NOTE — PROGRESS NOTES
COVID-19 Outpatient Progress Note    Assessment/Plan:    Problem List Items Addressed This Visit        Other    COVID-19 virus infection - Primary     11May: Day 6  Patient does report his shortness of breath has improved somewhat since beginning albuterol inhaler however, he still reports persistent dyspnea on exertion  Patient does report his cough has improved since beginning Phenergan DM  Patient is still reporting nasal congestion despite daily Flonase use and Prednisone taper use  Pulmicort ordered to help with SOB  Patient reports he will begin taking Mucinex today to help with sputum expectoration  Will follow-up with patient again on 05/06/2021  Relevant Medications    budesonide (PULMICORT) 90 MCG/ACT inhaler         Disposition:     I recommended continued isolation until at least 24 hours have passed since recovery defined as resolution of fever without the use of fever-reducing medications AND improvement in COVID symptoms AND 10 days have passed since onset of symptoms (or 10 days have passed since date of first positive viral diagnostic test for asymptomatic patients)  I have spent 15 minutes directly with the patient  Encounter provider DILMA Forte    Provider located at [address] [place] [phone]    Recent Visits  Date Type Provider Dept   05/04/21 [facility]medicine [facility]   Showing recent visits within past 7 days and meeting all other requirements     Today's Visits  Date Type Provider Dept   05/05/21 [facility]medicine [facility]   Showing today's visits and meeting all other requirements     Future Appointments  No visits were found meeting these conditions     Showing future appointments within next 150 days and meeting all other requirements      This virtual check-in was done via Google Duo and patient was informed that this is not a secure, HIPAA-compliant platform  He agrees to proceed  Patient agrees to participate in a virtual check in via telephone or video visit instead of presenting to the office to address urgent/immediate medical needs  Patient is aware this is a billable service  After connecting through [facility], the patient was identified by name and date of birth  Amber Rojas was informed that this was a telemedicine visit and that the exam was being conducted confidentially over secure lines  My office door was closed  No one else was in the room  Amber Rojas acknowledged consent and understanding of privacy and security of the telemedicine visit  I informed the patient that I have reviewed his record in Epic and presented the opportunity for him to ask any questions regarding the visit today  The patient agreed to participate  Subjective:   Amber Rojas is a 31 y o  male who has been screened for COVID-19  Symptom change since last report: unchanged  Patient's symptoms include fatigue, malaise, nasal congestion, anosmia, loss of taste, cough (productive ), shortness of breath, chest tightness and myalgias  Patient denies fever, chills, rhinorrhea, sore throat, abdominal pain, nausea, vomiting, diarrhea and headaches  Claudia Tom has been staying home and has isolated themselves in his home  He is taking care to not share personal items and is cleaning all surfaces that are touched often, like counters, tabletops, and doorknobs using household cleaning sprays or wipes  He is wearing a mask when he leaves his room  Date of symptom onset: 4/24/2021  Date of positive COVID-19 PCR: 5/2/2021    Patient is reporting symptoms of productive cough, nasal congestion, myalgias, loss of smell and taste, and increased fatigue  The patient does report his cough has improved since beginning Phenergan DM    The patient reports he is using the albuterol inhaler every 6 hours but is still experiencing shortness of breath intermittently with rest and dyspnea on exertion  He reports the inhaler is somewhat relieving his symptoms but he is still frequently becoming short of breath  Patient was afebrile today  Lab Results   Component Value Date    SARSCOV2 Positive (A) 05/02/2021     No past medical history on file  No past surgical history on file  Current Outpatient Medications   Medication Sig Dispense Refill    albuterol (PROVENTIL HFA,VENTOLIN HFA) 90 mcg/act inhaler Inhale 2 puffs every 6 (six) hours as needed for wheezing or shortness of breath 8 g 0    budesonide (PULMICORT) 90 MCG/ACT inhaler Inhale 1 puff 2 (two) times a day 1 each 0    diclofenac (VOLTAREN) 75 mg EC tablet Take 1 tablet (75 mg total) by mouth 2 (two) times a day (Patient not taking: Reported on 5/2/2021) 30 tablet 0    fluticasone (FLONASE) 50 mcg/act nasal spray 2 sprays into each nostril daily 16 g 0    predniSONE 10 mg tablet 4 x 3 days, 3 x 1, 2 x 1, 1 x 1 18 tablet 0    Promethazine-DM (PHENERGAN-DM) 6 25-15 mg/5 mL oral syrup Take 2 5 mL by mouth 4 (four) times a day as needed for cough 118 mL 0    tiZANidine (ZANAFLEX) 4 mg tablet Take 1 tablet (4 mg total) by mouth every 8 (eight) hours as needed for muscle spasms (Patient not taking: Reported on 5/2/2021) 30 tablet 0     Current Facility-Administered Medications   Medication Dose Route Frequency Provider Last Rate Last Admin    methylPREDNISolone acetate (DEPO-MEDROL) injection 80 mg  80 mg Intramuscular Once Andria Morales MD         No Known Allergies    Review of Systems   Constitutional: Positive for fatigue  Negative for chills and fever  HENT: Positive for congestion  Negative for rhinorrhea and sore throat  Eyes: Negative  Respiratory: Positive for cough (productive ), chest tightness and shortness of breath  Cardiovascular: Negative  Gastrointestinal: Negative for abdominal pain, diarrhea, nausea and vomiting  Endocrine: Negative  Genitourinary: Negative      Musculoskeletal: Positive for myalgias  Skin: Negative  Allergic/Immunologic: Negative  Neurological: Negative for headaches  Hematological: Negative  Psychiatric/Behavioral: Negative  Objective: There were no vitals filed for this visit  Physical Exam  Vitals reviewed: limited due to Google Duo exam    Constitutional:       General: He is not in acute distress  Appearance: Normal appearance  He is not ill-appearing  Neurological:      Mental Status: He is alert  VIRTUAL VISIT DISCLAIMER    Kirk Parada acknowledges that he has consented to an online visit or consultation  He understands that the online visit is based solely on information provided by him, and that, in the absence of a face-to-face physical evaluation by the physician, the diagnosis he receives is both limited and provisional in terms of accuracy and completeness  This is not intended to replace a full medical face-to-face evaluation by the physician  Kirk Parada understands and accepts these terms

## 2021-05-06 ENCOUNTER — TELEMEDICINE (OUTPATIENT)
Dept: FAMILY MEDICINE CLINIC | Facility: CLINIC | Age: 36
End: 2021-05-06
Payer: COMMERCIAL

## 2021-05-06 DIAGNOSIS — U07.1 COVID-19 VIRUS INFECTION: Primary | ICD-10-CM

## 2021-05-06 PROCEDURE — 99214 OFFICE O/P EST MOD 30 MIN: CPT | Performed by: NURSE PRACTITIONER

## 2021-05-06 NOTE — PATIENT INSTRUCTIONS
Problem List Items Addressed This Visit        Other    COVID-19 virus infection - Primary     6May: Day 15  Patient reports his dyspnea on exertion and cough have remained about the same  Hopefully the budesonide inhaler will be available at the pharmacy shortly so I can assess if this is improving his shortness of breath symptoms  I will follow-up with patient again on 05/08/2021  [address]    Your healthcare provider and/or public health staff have evaluated you and have determined that you do not need to remain in the hospital at this time  At this time you can be isolated at home where you will be monitored by staff from your local or state health department  You should carefully follow the prevention and isolation steps below until a healthcare provider or local or state health department says that you can return to your normal activities  Stay home except to get medical care    People who are mildly ill with COVID-19 are able to isolate at home during their illness  You should restrict activities outside your home, except for getting medical care  Do not go to work, school, or public areas  Avoid using public transportation, ride-sharing, or taxis  Separate yourself from other people and animals in your home    People: As much as possible, you should stay in a specific room and away from other people in your home  Also, you should use a separate bathroom, if available  Animals: You should restrict contact with pets and other animals while you are sick with COVID-19, just like you would around other people  Although there have not been reports of pets or other animals becoming sick with COVID-19, it is still recommended that people sick with COVID-19 limit contact with animals until more information is known about the virus  When possible, have another member of your household care for your animals while you are sick   If you are sick with COVID-19, avoid contact with your pet, including petting, snuggling, being kissed or licked, and sharing food  If you must care for your pet or be around animals while you are sick, wash your hands before and after you interact with pets and wear a facemask  See COVID-19 and Animals for more information  Call ahead before visiting your doctor    If you have a medical appointment, call the healthcare provider and tell them that you have or may have COVID-19  This will help the healthcare providers office take steps to keep other people from getting infected or exposed  Wear a facemask    You should wear a facemask when you are around other people (e g , sharing a room or vehicle) or pets and before you enter a healthcare providers office  If you are not able to wear a facemask (for example, because it causes trouble breathing), then people who live with you should not stay in the same room with you, or they should wear a facemask if they enter your room  Cover your coughs and sneezes    Cover your mouth and nose with a tissue when you cough or sneeze  Throw used tissues in a lined trash can  Immediately wash your hands with soap and water for at least 20 seconds or, if soap and water are not available, clean your hands with an alcohol-based hand  that contains at least 60% alcohol  Clean your hands often    Wash your hands often with soap and water for at least 20 seconds, especially after blowing your nose, coughing, or sneezing; going to the bathroom; and before eating or preparing food  If soap and water are not readily available, use an alcohol-based hand  with at least 60% alcohol, covering all surfaces of your hands and rubbing them together until they feel dry  Soap and water are the best option if hands are visibly dirty  Avoid touching your eyes, nose, and mouth with unwashed hands      Avoid sharing personal household items    You should not share dishes, drinking glasses, cups, eating utensils, towels, or bedding with other people or pets in your home  After using these items, they should be washed thoroughly with soap and water  Clean all high-touch surfaces everyday    High touch surfaces include counters, tabletops, doorknobs, bathroom fixtures, toilets, phones, keyboards, tablets, and bedside tables  Also, clean any surfaces that may have blood, stool, or body fluids on them  Use a household cleaning spray or wipe, according to the label instructions  Labels contain instructions for safe and effective use of the cleaning product including precautions you should take when applying the product, such as wearing gloves and making sure you have good ventilation during use of the product  Monitor your symptoms    Seek prompt medical attention if your illness is worsening (e g , difficulty breathing)  Before seeking care, call your healthcare provider and tell them that you have, or are being evaluated for, COVID-19  Put on a facemask before you enter the facility  These steps will help the healthcare providers office to keep other people in the office or waiting room from getting infected or exposed  Ask your healthcare provider to call the local or [facility] health department  Persons who are placed under active monitoring or facilitated self-monitoring should follow instructions provided by their local health department or occupational health professionals, as appropriate  If you have a medical emergency and need to call 911, notify the dispatch personnel that you have, or are being evaluated for COVID-19  If possible, put on a facemask before emergency medical services arrive      Discontinuing home isolation    Patients with confirmed COVID-19 should remain under home isolation precautions until the following conditions are met:   - They have had no fever for at least 24 hours (that is one full day of no fever without the use medicine that reduces fevers)  AND  - other symptoms have improved (for example, when their cough or shortness of breath have improved)  AND  - If had mild or moderate illness, at least 10 days have passed since their symptoms first appeared or if severe illness (needed oxygen) or immunosuppressed, at least 20 days have passed since symptoms first appeared  Patients with confirmed COVID-19 should also notify close contacts (including their workplace) and ask that they self-quarantine  Currently, close contact is defined as being within 6 feet for 15 minutes or more from the period 24 hours starting 48 hours before symptom onset to the time at which the patient went into isolation  Close contacts of patients diagnosed with COVID-19 should be instructed by the patient to self-quarantine for 14 days from the last time of their last contact with the patient       Source: [employer]

## 2021-05-06 NOTE — ASSESSMENT & PLAN NOTE
6May: Day 12  Patient reports his dyspnea on exertion and cough have remained about the same  Hopefully the budesonide inhaler will be available at the pharmacy shortly so I can assess if this is improving his shortness of breath symptoms  I will follow-up with patient again on 05/08/2021

## 2021-05-06 NOTE — PROGRESS NOTES
COVID-19 Outpatient Progress Note    Assessment/Plan:    Problem List Items Addressed This Visit        Other    COVID-19 virus infection - Primary     6May: Day 15  Patient reports his dyspnea on exertion and cough have remained about the same  Hopefully the budesonide inhaler will be available at the pharmacy shortly so I can assess if this is improving his shortness of breath symptoms  I will follow-up with patient again on 05/08/2021  Disposition:     I recommended continued isolation until at least 24 hours have passed since recovery defined as resolution of fever without the use of fever-reducing medications AND improvement in COVID symptoms AND 10 days have passed since onset of symptoms (or 10 days have passed since date of first positive viral diagnostic test for asymptomatic patients)  I have spent 15 minutes directly with the patient  Encounter provider DILMA Beaulieu    Provider located at [address] [place] [phone]    Recent Visits  Date Type Provider Dept   05/05/21 Telemedicine DILMA Beaulieu [facility]   05/04/21 [facility]medicine [facility]   Showing recent visits within past 7 days and meeting all other requirements     Today's Visits  Date Type Provider Dept   05/06/21 [facility]medicine [facility]   Showing today's visits and meeting all other requirements     Future Appointments  No visits were found meeting these conditions  Showing future appointments within next 150 days and meeting all other requirements      This virtual check-in was done via Google Duo and patient was informed that this is not a secure, HIPAA-compliant platform  He agrees to proceed  Patient agrees to participate in a virtual check in via telephone or video visit instead of presenting to the office to address urgent/immediate medical needs   Patient is aware this is a billable service  After connecting through [facility], the patient was identified by name and date of birth  Sally Mahajan was informed that this was a telemedicine visit and that the exam was being conducted confidentially over secure lines  My office door was closed  No one else was in the room  Sally Mahajan acknowledged consent and understanding of privacy and security of the telemedicine visit  I informed the patient that I have reviewed his record in Epic and presented the opportunity for him to ask any questions regarding the visit today  The patient agreed to participate  Subjective:   Sally Mahajan is a 31 y o  male who has been screened for COVID-19  Symptom change since last report: unchanged  Patient's symptoms include fatigue, malaise, nasal congestion, sore throat, anosmia, cough (non-productive ), shortness of breath and chest tightness  Patient denies fever, chills, rhinorrhea, loss of taste, abdominal pain, nausea, vomiting, diarrhea, myalgias and headaches  Richar Patel has been staying home and has isolated themselves in his home  He is taking care to not share personal items and is cleaning all surfaces that are touched often, like counters, tabletops, and doorknobs using household cleaning sprays or wipes  He is wearing a mask when he leaves his room  Date of symptom onset: 4/24/2021  Date of positive COVID-19 PCR: 5/2/2021    Patient is reporting symptoms of intermittent nonproductive cough, nasal congestion, sore throat, loss of smell, and increased fatigue  Patient reports that his dyspnea on exertion has remained about the same  He is still using the albuterol inhaler every 6 hours with some relief  He was unable to begin budesonide inhaler as it was not in stock at the pharmacy  However, he was told that it was in transit today  Patient also reports that his myalgias have resolved  Patient denies any fevers or chills      Lab Results   Component Value Date SARSCOV2 Positive (A) 05/02/2021     No past medical history on file  No past surgical history on file  Current Outpatient Medications   Medication Sig Dispense Refill    albuterol (PROVENTIL HFA,VENTOLIN HFA) 90 mcg/act inhaler Inhale 2 puffs every 6 (six) hours as needed for wheezing or shortness of breath 8 g 0    budesonide (PULMICORT) 90 MCG/ACT inhaler Inhale 1 puff 2 (two) times a day 1 each 0    diclofenac (VOLTAREN) 75 mg EC tablet Take 1 tablet (75 mg total) by mouth 2 (two) times a day (Patient not taking: Reported on 5/2/2021) 30 tablet 0    fluticasone (FLONASE) 50 mcg/act nasal spray 2 sprays into each nostril daily 16 g 0    predniSONE 10 mg tablet 4 x 3 days, 3 x 1, 2 x 1, 1 x 1 18 tablet 0    Promethazine-DM (PHENERGAN-DM) 6 25-15 mg/5 mL oral syrup Take 2 5 mL by mouth 4 (four) times a day as needed for cough 118 mL 0    tiZANidine (ZANAFLEX) 4 mg tablet Take 1 tablet (4 mg total) by mouth every 8 (eight) hours as needed for muscle spasms (Patient not taking: Reported on 5/2/2021) 30 tablet 0     Current Facility-Administered Medications   Medication Dose Route Frequency Provider Last Rate Last Admin    methylPREDNISolone acetate (DEPO-MEDROL) injection 80 mg  80 mg Intramuscular Once Unknown MD Zeke         No Known Allergies    Review of Systems   Constitutional: Positive for fatigue  Negative for chills and fever  HENT: Positive for congestion and sore throat  Negative for rhinorrhea  Eyes: Negative  Respiratory: Positive for cough (non-productive ), chest tightness and shortness of breath  Cardiovascular: Negative  Gastrointestinal: Negative for abdominal pain, diarrhea, nausea and vomiting  Endocrine: Negative  Genitourinary: Negative  Musculoskeletal: Negative for myalgias  Skin: Negative  Allergic/Immunologic: Negative  Neurological: Negative for headaches  Hematological: Negative  Psychiatric/Behavioral: Negative  Objective:     There were no vitals filed for this visit  Physical Exam  Vitals reviewed: limited due to Google Duo exam    Constitutional:       General: He is not in acute distress  Appearance: Normal appearance  He is not ill-appearing  Neurological:      Mental Status: He is alert  VIRTUAL VISIT DISCLAIMER    Nino Russell acknowledges that he has consented to an online visit or consultation  He understands that the online visit is based solely on information provided by him, and that, in the absence of a face-to-face physical evaluation by the physician, the diagnosis he receives is both limited and provisional in terms of accuracy and completeness  This is not intended to replace a full medical face-to-face evaluation by the physician  Nino Russell understands and accepts these terms

## 2021-05-08 ENCOUNTER — TELEMEDICINE (OUTPATIENT)
Dept: FAMILY MEDICINE CLINIC | Facility: CLINIC | Age: 36
End: 2021-05-08
Payer: COMMERCIAL

## 2021-05-08 DIAGNOSIS — U07.1 COVID-19 VIRUS INFECTION: Primary | ICD-10-CM

## 2021-05-08 PROCEDURE — 99213 OFFICE O/P EST LOW 20 MIN: CPT | Performed by: NURSE PRACTITIONER

## 2021-05-08 NOTE — ASSESSMENT & PLAN NOTE
8May: Day 14  Patient reports his shortness of breath has improved with b i d  Budesonide inhaler use  The rest of his symptoms have remained stable  Patient is cleared to return from isolation however, I will continue to follow up with him until his breathing returns closer to his baseline  I will follow-up with patient again on 05/10/2021

## 2021-05-08 NOTE — PROGRESS NOTES
COVID-19 Outpatient Progress Note    Assessment/Plan:    Problem List Items Addressed This Visit        Other    COVID-19 virus infection - Primary     8May: Day 15  Patient reports his shortness of breath has improved with b i d  Budesonide inhaler use  The rest of his symptoms have remained stable  Patient is cleared to return from isolation however, I will continue to follow up with him until his breathing returns closer to his baseline  I will follow-up with patient again on 05/10/2021  Disposition:     Patient is cleared to return from isolation  I have spent 15 minutes directly with the patient  Encounter provider DILMA Clay    Provider located at [address] [place] [phone]    Recent Visits  Date Type Provider Dept   05/06/21 Telemedicine DILMA James [facility]   05/05/21 [facility] Endy Augustin, Fabrice Casia St Pg [facility]   05/04/21 Telemedicine Deanna [facility]   Showing recent visits within past 7 days and meeting all other requirements     Today's Visits  Date Type Provider Dept   05/08/21 Telemedicine De[facility]   Showing today's visits and meeting all other requirements     Future Appointments  No visits were found meeting these conditions  Showing future appointments within next 150 days and meeting all other requirements      This virtual check-in was done via Google Duo and patient was informed that this is not a secure, HIPAA-compliant platform  He agrees to proceed  Patient agrees to participate in a virtual check in via telephone or video visit instead of presenting to the office to address urgent/immediate medical needs  Patient is aware this is a billable service  After connecting through [facility], the patient was identified by name and date of birth   Ashish Otero was informed that this was a telemedicine visit and that the exam was being conducted confidentially over secure lines  My office door was closed  No one else was in the room  Dennis Sanchez acknowledged consent and understanding of privacy and security of the telemedicine visit  I informed the patient that I have reviewed his record in Epic and presented the opportunity for him to ask any questions regarding the visit today  The patient agreed to participate  Subjective:   Dennis Sanchez is a 31 y o  male who has been screened for COVID-19  Symptom change since last report: improving  Patient's symptoms include fatigue, malaise, nasal congestion, anosmia, cough (productive ), shortness of breath and chest tightness  Patient denies fever, chills, rhinorrhea, sore throat, loss of taste, abdominal pain, nausea, vomiting, diarrhea, myalgias and headaches  King Silva has been staying home and has isolated themselves in his home  He is taking care to not share personal items and is cleaning all surfaces that are touched often, like counters, tabletops, and doorknobs using household cleaning sprays or wipes  He is wearing a mask when he leaves his room  Date of symptom onset: 4/24/2021  Date of positive COVID-19 PCR: 5/2/2021    Patient is reporting symptoms of productive cough, nasal congestion, loss of smell, and increased fatigue  He does report that his cough, congestion, and fatigue are all improving  He also reports that he was able to begin using the budesonide inhaler and he states this has improved his shortness of breath  He also states he has not needed to use the albuterol inhaler every 6 hours since beginning the budesonide inhaler  Patient has been afebrile for over 24 hours without the use of antipyretics  It has been 14 days since the patient's symptoms 1st began therefore, I will clear the patient to return from isolation  I will continue to follow up with the patient until his breathing returns closer to his baseline      Lab Results   Component Value Date    SARSCOV2 Positive (A) 05/02/2021     No past medical history on file  No past surgical history on file  Current Outpatient Medications   Medication Sig Dispense Refill    albuterol (PROVENTIL HFA,VENTOLIN HFA) 90 mcg/act inhaler Inhale 2 puffs every 6 (six) hours as needed for wheezing or shortness of breath 8 g 0    budesonide (PULMICORT) 90 MCG/ACT inhaler Inhale 1 puff 2 (two) times a day 1 each 0    diclofenac (VOLTAREN) 75 mg EC tablet Take 1 tablet (75 mg total) by mouth 2 (two) times a day (Patient not taking: Reported on 5/2/2021) 30 tablet 0    fluticasone (FLONASE) 50 mcg/act nasal spray 2 sprays into each nostril daily 16 g 0    predniSONE 10 mg tablet 4 x 3 days, 3 x 1, 2 x 1, 1 x 1 18 tablet 0    Promethazine-DM (PHENERGAN-DM) 6 25-15 mg/5 mL oral syrup Take 2 5 mL by mouth 4 (four) times a day as needed for cough 118 mL 0    tiZANidine (ZANAFLEX) 4 mg tablet Take 1 tablet (4 mg total) by mouth every 8 (eight) hours as needed for muscle spasms (Patient not taking: Reported on 5/2/2021) 30 tablet 0     Current Facility-Administered Medications   Medication Dose Route Frequency Provider Last Rate Last Admin    methylPREDNISolone acetate (DEPO-MEDROL) injection 80 mg  80 mg Intramuscular Once Jennifer Coffman MD         No Known Allergies    Review of Systems   Constitutional: Positive for fatigue  Negative for chills and fever  HENT: Positive for congestion  Negative for rhinorrhea and sore throat  Eyes: Negative  Respiratory: Positive for cough (productive ), chest tightness and shortness of breath  Cardiovascular: Negative  Gastrointestinal: Negative for abdominal pain, diarrhea, nausea and vomiting  Endocrine: Negative  Genitourinary: Negative  Musculoskeletal: Negative for myalgias  Skin: Negative  Allergic/Immunologic: Negative  Neurological: Negative for headaches  Hematological: Negative  Psychiatric/Behavioral: Negative  Objective: There were no vitals filed for this visit  Physical Exam  Vitals reviewed: limited due to Google Duo exam    Constitutional:       General: He is not in acute distress  Appearance: Normal appearance  He is not ill-appearing  Neurological:      Mental Status: He is alert  VIRTUAL VISIT DISCLAIMER    Verner Blackwater acknowledges that he has consented to an online visit or consultation  He understands that the online visit is based solely on information provided by him, and that, in the absence of a face-to-face physical evaluation by the physician, the diagnosis he receives is both limited and provisional in terms of accuracy and completeness  This is not intended to replace a full medical face-to-face evaluation by the physician  Verner Blackwater understands and accepts these terms

## 2021-05-08 NOTE — PATIENT INSTRUCTIONS
Problem List Items Addressed This Visit        Other    COVID-19 virus infection - Primary     8May: Day 15  Patient reports his shortness of breath has improved with b i d  Budesonide inhaler use  The rest of his symptoms have remained stable  Patient is cleared to return from isolation however, I will continue to follow up with him until his breathing returns closer to his baseline  I will follow-up with patient again on 05/10/2021  [address]    Your healthcare provider and/or public health staff have evaluated you and have determined that you do not need to remain in the hospital at this time  At this time you can be isolated at home where you will be monitored by staff from your local or state health department  You should carefully follow the prevention and isolation steps below until a healthcare provider or local or state health department says that you can return to your normal activities  Stay home except to get medical care    People who are mildly ill with COVID-19 are able to isolate at home during their illness  You should restrict activities outside your home, except for getting medical care  Do not go to work, school, or public areas  Avoid using public transportation, ride-sharing, or taxis  Separate yourself from other people and animals in your home    People: As much as possible, you should stay in a specific room and away from other people in your home  Also, you should use a separate bathroom, if available  Animals: You should restrict contact with pets and other animals while you are sick with COVID-19, just like you would around other people  Although there have not been reports of pets or other animals becoming sick with COVID-19, it is still recommended that people sick with COVID-19 limit contact with animals until more information is known about the virus   When possible, have another member of your household care for your animals while you are sick  If you are sick with COVID-19, avoid contact with your pet, including petting, snuggling, being kissed or licked, and sharing food  If you must care for your pet or be around animals while you are sick, wash your hands before and after you interact with pets and wear a facemask  See COVID-19 and Animals for more information  Call ahead before visiting your doctor    If you have a medical appointment, call the healthcare provider and tell them that you have or may have COVID-19  This will help the healthcare providers office take steps to keep other people from getting infected or exposed  Wear a facemask    You should wear a facemask when you are around other people (e g , sharing a room or vehicle) or pets and before you enter a healthcare providers office  If you are not able to wear a facemask (for example, because it causes trouble breathing), then people who live with you should not stay in the same room with you, or they should wear a facemask if they enter your room  Cover your coughs and sneezes    Cover your mouth and nose with a tissue when you cough or sneeze  Throw used tissues in a lined trash can  Immediately wash your hands with soap and water for at least 20 seconds or, if soap and water are not available, clean your hands with an alcohol-based hand  that contains at least 60% alcohol  Clean your hands often    Wash your hands often with soap and water for at least 20 seconds, especially after blowing your nose, coughing, or sneezing; going to the bathroom; and before eating or preparing food  If soap and water are not readily available, use an alcohol-based hand  with at least 60% alcohol, covering all surfaces of your hands and rubbing them together until they feel dry  Soap and water are the best option if hands are visibly dirty  Avoid touching your eyes, nose, and mouth with unwashed hands      Avoid sharing personal household items    You should not share dishes, drinking glasses, cups, eating utensils, towels, or bedding with other people or pets in your home  After using these items, they should be washed thoroughly with soap and water  Clean all high-touch surfaces everyday    High touch surfaces include counters, tabletops, doorknobs, bathroom fixtures, toilets, phones, keyboards, tablets, and bedside tables  Also, clean any surfaces that may have blood, stool, or body fluids on them  Use a household cleaning spray or wipe, according to the label instructions  Labels contain instructions for safe and effective use of the cleaning product including precautions you should take when applying the product, such as wearing gloves and making sure you have good ventilation during use of the product  Monitor your symptoms    Seek prompt medical attention if your illness is worsening (e g , difficulty breathing)  Before seeking care, call your healthcare provider and tell them that you have, or are being evaluated for, COVID-19  Put on a facemask before you enter the facility  These steps will help the healthcare providers office to keep other people in the office or waiting room from getting infected or exposed  Ask your healthcare provider to call the local or [facility] health department  Persons who are placed under active monitoring or facilitated self-monitoring should follow instructions provided by their local health department or occupational health professionals, as appropriate  If you have a medical emergency and need to call 911, notify the dispatch personnel that you have, or are being evaluated for COVID-19  If possible, put on a facemask before emergency medical services arrive      Discontinuing home isolation    Patients with confirmed COVID-19 should remain under home isolation precautions until the following conditions are met:   - They have had no fever for at least 24 hours (that is one full day of no fever without the use medicine that reduces fevers)  AND  - other symptoms have improved (for example, when their cough or shortness of breath have improved)  AND  - If had mild or moderate illness, at least 10 days have passed since their symptoms first appeared or if severe illness (needed oxygen) or immunosuppressed, at least 20 days have passed since symptoms first appeared  Patients with confirmed COVID-19 should also notify close contacts (including their workplace) and ask that they self-quarantine  Currently, close contact is defined as being within 6 feet for 15 minutes or more from the period 24 hours starting 48 hours before symptom onset to the time at which the patient went into isolation  Close contacts of patients diagnosed with COVID-19 should be instructed by the patient to self-quarantine for 14 days from the last time of their last contact with the patient       Source: [employer]

## 2021-05-10 ENCOUNTER — APPOINTMENT (OUTPATIENT)
Dept: RADIOLOGY | Facility: MEDICAL CENTER | Age: 36
End: 2021-05-10
Payer: COMMERCIAL

## 2021-05-10 ENCOUNTER — TELEMEDICINE (OUTPATIENT)
Dept: FAMILY MEDICINE CLINIC | Facility: CLINIC | Age: 36
End: 2021-05-10
Payer: COMMERCIAL

## 2021-05-10 DIAGNOSIS — U07.1 COVID-19 VIRUS INFECTION: Primary | ICD-10-CM

## 2021-05-10 DIAGNOSIS — U07.1 COVID-19 VIRUS INFECTION: ICD-10-CM

## 2021-05-10 PROCEDURE — 99214 OFFICE O/P EST MOD 30 MIN: CPT | Performed by: NURSE PRACTITIONER

## 2021-05-10 PROCEDURE — 71046 X-RAY EXAM CHEST 2 VIEWS: CPT

## 2021-05-10 RX ORDER — ALBUTEROL SULFATE 90 UG/1
2 AEROSOL, METERED RESPIRATORY (INHALATION) EVERY 6 HOURS PRN
Qty: 8 G | Refills: 0 | Status: SHIPPED | OUTPATIENT
Start: 2021-05-10 | End: 2021-05-11

## 2021-05-10 RX ORDER — DEXTROMETHORPHAN HYDROBROMIDE AND PROMETHAZINE HYDROCHLORIDE 15; 6.25 MG/5ML; MG/5ML
2.5 SOLUTION ORAL 4 TIMES DAILY PRN
Qty: 118 ML | Refills: 0 | Status: SHIPPED | OUTPATIENT
Start: 2021-05-10

## 2021-05-10 NOTE — PROGRESS NOTES
COVID-19 Outpatient Progress Note    Assessment/Plan:    Problem List Items Addressed This Visit        Other    COVID-19 virus infection - Primary     10May: Day 16  Albuterol inhaler and Phenergan DM reordered to help with patient's symptoms of cough and intermittent shortness of breath  Chest x-ray ordered to assess for pneumonia  Will follow-up with patient again on 05/12/2021  Relevant Medications    albuterol (PROVENTIL HFA,VENTOLIN HFA) 90 mcg/act inhaler    Promethazine-DM (PHENERGAN-DM) 6 25-15 mg/5 mL oral syrup    Other Relevant Orders    XR chest pa & lateral         Disposition:     Patient was previously cleared to return from isolation  I have spent 15 minutes directly with the patient  Encounter provider DILMA Guy    Provider located at [address] [place] [phone]    Recent Visits  Date Type Provider Dept   05/08/21 DILMA Berry Pg [facility]   05/06/21 [facility]   05/05/21 Telemedicine DILMA James Pg [facility]   05/04/21 [facility]   Showing recent visits within past 7 days and meeting all other requirements     Today's Visits  Date Type Provider Dept   05/10/21 [facility]   Showing today's visits and meeting all other requirements     Future Appointments  No visits were found meeting these conditions  Showing future appointments within next 150 days and meeting all other requirements      This virtual check-in was done via Google Duo and patient was informed that this is not a secure, HIPAA-compliant platform  He agrees to proceed  Patient agrees to participate in a virtual check in via telephone or video visit instead of presenting to the office to address urgent/immediate medical needs   Patient is aware this is a billable service  After connecting through [facility], the patient was identified by name and date of birth  Scotty Alcazar was informed that this was a telemedicine visit and that the exam was being conducted confidentially over secure lines  My office door was closed  No one else was in the room  Scotty Alcazar acknowledged consent and understanding of privacy and security of the telemedicine visit  I informed the patient that I have reviewed his record in Epic and presented the opportunity for him to ask any questions regarding the visit today  The patient agreed to participate  Subjective:   Scotty Alcazar is a 31 y o  male who has been screened for COVID-19  Symptom change since last report: improving  Patient's symptoms include fatigue, malaise, nasal congestion, cough (productive ), shortness of breath, chest tightness and headache  Patient denies fever, chills, rhinorrhea, sore throat, anosmia, loss of taste, abdominal pain, nausea, vomiting, diarrhea and myalgias  Staying home and isolating themselves?: has not      Taking care not to share personal items?: is not      Cleaning all surfaces that are touched often?: is not      Wearing a mask when leaving room?: is not      Date of symptom onset: 4/24/2021  Date of positive COVID-19 PCR: 5/2/2021    Patient is reporting frequent productive cough, nasal congestion, frequent headaches, and increased fatigue  He does report his fatigue is improving  Patient also reports he had previously run out of Phenergan DM so I will reorder this for him  Patient is also reporting continued shortness of breath  He does report he has been using the [facility] as prescribed but he did previously run out of the albuterol inhaler  I will reorder this for him  Chest x-ray will also be ordered to assess for pneumonia  Lab Results   Component Value Date    SARSCOV2 Positive (A) 05/02/2021     No past medical history on file  No past surgical history on file    Current Outpatient Medications   Medication Sig Dispense Refill    albuterol (PROVENTIL HFA,VENTOLIN HFA) 90 mcg/act inhaler Inhale 2 puffs every 6 (six) hours as needed for wheezing or shortness of breath 8 g 0    budesonide (PULMICORT) 90 MCG/ACT inhaler Inhale 1 puff 2 (two) times a day 1 each 0    diclofenac (VOLTAREN) 75 mg EC tablet Take 1 tablet (75 mg total) by mouth 2 (two) times a day (Patient not taking: Reported on 5/2/2021) 30 tablet 0    fluticasone (FLONASE) 50 mcg/act nasal spray 2 sprays into each nostril daily 16 g 0    predniSONE 10 mg tablet 4 x 3 days, 3 x 1, 2 x 1, 1 x 1 18 tablet 0    Promethazine-DM (PHENERGAN-DM) 6 25-15 mg/5 mL oral syrup Take 2 5 mL by mouth 4 (four) times a day as needed for cough 118 mL 0    tiZANidine (ZANAFLEX) 4 mg tablet Take 1 tablet (4 mg total) by mouth every 8 (eight) hours as needed for muscle spasms (Patient not taking: Reported on 5/2/2021) 30 tablet 0     Current Facility-Administered Medications   Medication Dose Route Frequency Provider Last Rate Last Admin    methylPREDNISolone acetate (DEPO-MEDROL) injection 80 mg  80 mg Intramuscular Once Tracie Day MD         No Known Allergies    Review of Systems   Constitutional: Positive for fatigue  Negative for chills and fever  HENT: Positive for congestion  Negative for rhinorrhea and sore throat  Eyes: Negative  Respiratory: Positive for cough (productive ), chest tightness and shortness of breath  Cardiovascular: Negative  Gastrointestinal: Negative for abdominal pain, diarrhea, nausea and vomiting  Endocrine: Negative  Genitourinary: Negative  Musculoskeletal: Negative for myalgias  Skin: Negative  Allergic/Immunologic: Negative  Neurological: Positive for headaches  Hematological: Negative  Psychiatric/Behavioral: Negative  Objective: There were no vitals filed for this visit      Physical Exam  Vitals reviewed: limited due to Google Duo exam    Constitutional: General: He is not in acute distress  Appearance: Normal appearance  He is not ill-appearing  Neurological:      Mental Status: He is alert  VIRTUAL VISIT DISCLAIMER    Parisa Arellano acknowledges that he has consented to an online visit or consultation  He understands that the online visit is based solely on information provided by him, and that, in the absence of a face-to-face physical evaluation by the physician, the diagnosis he receives is both limited and provisional in terms of accuracy and completeness  This is not intended to replace a full medical face-to-face evaluation by the physician  Parisa Arellano understands and accepts these terms

## 2021-05-10 NOTE — PATIENT INSTRUCTIONS
Problem List Items Addressed This Visit        Other    COVID-19 virus infection - Primary     10May: Day 12  Albuterol inhaler and Phenergan DM reordered to help with patient's symptoms of cough and intermittent shortness of breath  Chest x-ray ordered to assess for pneumonia  Will follow-up with patient again on 05/12/2021  Relevant Medications    albuterol (PROVENTIL HFA,VENTOLIN HFA) 90 mcg/act inhaler    Promethazine-DM (PHENERGAN-DM) 6 25-15 mg/5 mL oral syrup    Other Relevant Orders    XR chest pa & lateral        COVID-19 Home Care Guidelines    Your healthcare provider and/or public health staff have evaluated you and have determined that you do not need to remain in the hospital at this time  At this time you can be isolated at home where you will be monitored by staff from your local or state health department  You should carefully follow the prevention and isolation steps below until a healthcare provider or local or state health department says that you can return to your normal activities  Stay home except to get medical care    People who are mildly ill with COVID-19 are able to isolate at home during their illness  You should restrict activities outside your home, except for getting medical care  Do not go to work, school, or public areas  Avoid using public transportation, ride-sharing, or taxis  Separate yourself from other people and animals in your home    People: As much as possible, you should stay in a specific room and away from other people in your home  Also, you should use a separate bathroom, if available  Animals: You should restrict contact with pets and other animals while you are sick with COVID-19, just like you would around other people  Although there have not been reports of pets or other animals becoming sick with COVID-19, it is still recommended that people sick with COVID-19 limit contact with animals until more information is known about the virus   When possible, have another member of your household care for your animals while you are sick  If you are sick with COVID-19, avoid contact with your pet, including petting, snuggling, being kissed or licked, and sharing food  If you must care for your pet or be around animals while you are sick, wash your hands before and after you interact with pets and wear a facemask  See COVID-19 and Animals for more information  Call ahead before visiting your doctor    If you have a medical appointment, call the healthcare provider and tell them that you have or may have COVID-19  This will help the healthcare providers office take steps to keep other people from getting infected or exposed  Wear a facemask    You should wear a facemask when you are around other people (e g , sharing a room or vehicle) or pets and before you enter a healthcare providers office  If you are not able to wear a facemask (for example, because it causes trouble breathing), then people who live with you should not stay in the same room with you, or they should wear a facemask if they enter your room  Cover your coughs and sneezes    Cover your mouth and nose with a tissue when you cough or sneeze  Throw used tissues in a lined trash can  Immediately wash your hands with soap and water for at least 20 seconds or, if soap and water are not available, clean your hands with an alcohol-based hand  that contains at least 60% alcohol  Clean your hands often    Wash your hands often with soap and water for at least 20 seconds, especially after blowing your nose, coughing, or sneezing; going to the bathroom; and before eating or preparing food  If soap and water are not readily available, use an alcohol-based hand  with at least 60% alcohol, covering all surfaces of your hands and rubbing them together until they feel dry  Soap and water are the best option if hands are visibly dirty   Avoid touching your eyes, nose, and mouth with unwashed hands  Avoid sharing personal household items    You should not share dishes, drinking glasses, cups, eating utensils, towels, or bedding with other people or pets in your home  After using these items, they should be washed thoroughly with soap and water  Clean all high-touch surfaces everyday    High touch surfaces include counters, tabletops, doorknobs, bathroom fixtures, toilets, phones, keyboards, tablets, and bedside tables  Also, clean any surfaces that may have blood, stool, or body fluids on them  Use a household cleaning spray or wipe, according to the label instructions  Labels contain instructions for safe and effective use of the cleaning product including precautions you should take when applying the product, such as wearing gloves and making sure you have good ventilation during use of the product  Monitor your symptoms    Seek prompt medical attention if your illness is worsening (e g , difficulty breathing)  Before seeking care, call your healthcare provider and tell them that you have, or are being evaluated for, COVID-19  Put on a facemask before you enter the facility  These steps will help the healthcare providers office to keep other people in the office or waiting room from getting infected or exposed  Ask your healthcare provider to call the local or [facility] health department  Persons who are placed under active monitoring or facilitated self-monitoring should follow instructions provided by their local health department or occupational health professionals, as appropriate  If you have a medical emergency and need to call 911, notify the dispatch personnel that you have, or are being evaluated for COVID-19  If possible, put on a facemask before emergency medical services arrive      Discontinuing home isolation    Patients with confirmed COVID-19 should remain under home isolation precautions until the following conditions are met:   - They have had no fever for at least 24 hours (that is one full day of no fever without the use medicine that reduces fevers)  AND  - other symptoms have improved (for example, when their cough or shortness of breath have improved)  AND  - If had mild or moderate illness, at least 10 days have passed since their symptoms first appeared or if severe illness (needed oxygen) or immunosuppressed, at least 20 days have passed since symptoms first appeared  Patients with confirmed COVID-19 should also notify close contacts (including their workplace) and ask that they self-quarantine  Currently, close contact is defined as being within 6 feet for 15 minutes or more from the period 24 hours starting 48 hours before symptom onset to the time at which the patient went into isolation  Close contacts of patients diagnosed with COVID-19 should be instructed by the patient to self-quarantine for 14 days from the last time of their last contact with the patient       Source: [employer]

## 2021-05-10 NOTE — ASSESSMENT & PLAN NOTE
10May: Day 16  Albuterol inhaler and Phenergan DM reordered to help with patient's symptoms of cough and intermittent shortness of breath  Chest x-ray ordered to assess for pneumonia  Will follow-up with patient again on 05/12/2021

## 2021-05-11 DIAGNOSIS — U07.1 COVID-19 VIRUS INFECTION: ICD-10-CM

## 2021-05-11 RX ORDER — ALBUTEROL SULFATE 90 UG/1
AEROSOL, METERED RESPIRATORY (INHALATION)
Qty: 6.7 G | Refills: 0 | Status: SHIPPED | OUTPATIENT
Start: 2021-05-11 | End: 2021-06-01

## 2021-05-12 ENCOUNTER — TELEMEDICINE (OUTPATIENT)
Dept: FAMILY MEDICINE CLINIC | Facility: CLINIC | Age: 36
End: 2021-05-12
Payer: COMMERCIAL

## 2021-05-12 VITALS — BODY MASS INDEX: 27.2 KG/M2 | TEMPERATURE: 96.1 F | WEIGHT: 195 LBS

## 2021-05-12 DIAGNOSIS — U07.1 COVID-19 VIRUS INFECTION: Primary | ICD-10-CM

## 2021-05-12 PROCEDURE — 3725F SCREEN DEPRESSION PERFORMED: CPT | Performed by: NURSE PRACTITIONER

## 2021-05-12 PROCEDURE — 99212 OFFICE O/P EST SF 10 MIN: CPT | Performed by: NURSE PRACTITIONER

## 2021-05-12 NOTE — ASSESSMENT & PLAN NOTE
12May: Day 18  Patient reports that his shortness of breath has remained stable and is relieved with albuterol inhaler use  He states he is only using the albuterol inhaler about twice per day now  Will follow-up with patient on 05/14/2021

## 2021-05-12 NOTE — LETTER
May 12, 2021     Patient: Max Sharp   YOB: 1985   Date of Visit: 5/12/2021       To Whom it May Concern:    Max Sharp is under my professional care  He was seen in my office on 5/12/2021  He may return to work on 5/17/21 with no restrictions  He is no longer considered contagious for COVID virus  If you have any questions or concerns, please don't hesitate to call           Sincerely,          DILMA Grijalva        CC: No Recipients

## 2021-05-12 NOTE — PATIENT INSTRUCTIONS
Problem List Items Addressed This Visit        Other    COVID-19 virus infection - Primary     12May: Day 25  Patient reports that his shortness of breath has remained stable and is relieved with albuterol inhaler use  He states he is only using the albuterol inhaler about twice per day now  Will follow-up with patient on 05/14/2021  [address]    Your healthcare provider and/or public health staff have evaluated you and have determined that you do not need to remain in the hospital at this time  At this time you can be isolated at home where you will be monitored by staff from your local or state health department  You should carefully follow the prevention and isolation steps below until a healthcare provider or local or state health department says that you can return to your normal activities  Stay home except to get medical care    People who are mildly ill with COVID-19 are able to isolate at home during their illness  You should restrict activities outside your home, except for getting medical care  Do not go to work, school, or public areas  Avoid using public transportation, ride-sharing, or taxis  Separate yourself from other people and animals in your home    People: As much as possible, you should stay in a specific room and away from other people in your home  Also, you should use a separate bathroom, if available  Animals: You should restrict contact with pets and other animals while you are sick with COVID-19, just like you would around other people  Although there have not been reports of pets or other animals becoming sick with COVID-19, it is still recommended that people sick with COVID-19 limit contact with animals until more information is known about the virus  When possible, have another member of your household care for your animals while you are sick   If you are sick with COVID-19, avoid contact with your pet, including petting, snuggling, being kissed or licked, and sharing food  If you must care for your pet or be around animals while you are sick, wash your hands before and after you interact with pets and wear a facemask  See COVID-19 and Animals for more information  Call ahead before visiting your doctor    If you have a medical appointment, call the healthcare provider and tell them that you have or may have COVID-19  This will help the healthcare providers office take steps to keep other people from getting infected or exposed  Wear a facemask    You should wear a facemask when you are around other people (e g , sharing a room or vehicle) or pets and before you enter a healthcare providers office  If you are not able to wear a facemask (for example, because it causes trouble breathing), then people who live with you should not stay in the same room with you, or they should wear a facemask if they enter your room  Cover your coughs and sneezes    Cover your mouth and nose with a tissue when you cough or sneeze  Throw used tissues in a lined trash can  Immediately wash your hands with soap and water for at least 20 seconds or, if soap and water are not available, clean your hands with an alcohol-based hand  that contains at least 60% alcohol  Clean your hands often    Wash your hands often with soap and water for at least 20 seconds, especially after blowing your nose, coughing, or sneezing; going to the bathroom; and before eating or preparing food  If soap and water are not readily available, use an alcohol-based hand  with at least 60% alcohol, covering all surfaces of your hands and rubbing them together until they feel dry  Soap and water are the best option if hands are visibly dirty  Avoid touching your eyes, nose, and mouth with unwashed hands      Avoid sharing personal household items    You should not share dishes, drinking glasses, cups, eating utensils, towels, or bedding with other people or pets in your home  After using these items, they should be washed thoroughly with soap and water  Clean all high-touch surfaces everyday    High touch surfaces include counters, tabletops, doorknobs, bathroom fixtures, toilets, phones, keyboards, tablets, and bedside tables  Also, clean any surfaces that may have blood, stool, or body fluids on them  Use a household cleaning spray or wipe, according to the label instructions  Labels contain instructions for safe and effective use of the cleaning product including precautions you should take when applying the product, such as wearing gloves and making sure you have good ventilation during use of the product  Monitor your symptoms    Seek prompt medical attention if your illness is worsening (e g , difficulty breathing)  Before seeking care, call your healthcare provider and tell them that you have, or are being evaluated for, COVID-19  Put on a facemask before you enter the facility  These steps will help the healthcare providers office to keep other people in the office or waiting room from getting infected or exposed  Ask your healthcare provider to call the local or [facility] health department  Persons who are placed under active monitoring or facilitated self-monitoring should follow instructions provided by their local health department or occupational health professionals, as appropriate  If you have a medical emergency and need to call 911, notify the dispatch personnel that you have, or are being evaluated for COVID-19  If possible, put on a facemask before emergency medical services arrive      Discontinuing home isolation    Patients with confirmed COVID-19 should remain under home isolation precautions until the following conditions are met:   - They have had no fever for at least 24 hours (that is one full day of no fever without the use medicine that reduces fevers)  AND  - other symptoms have improved (for example, when their cough or shortness of breath have improved)  AND  - If had mild or moderate illness, at least 10 days have passed since their symptoms first appeared or if severe illness (needed oxygen) or immunosuppressed, at least 20 days have passed since symptoms first appeared  Patients with confirmed COVID-19 should also notify close contacts (including their workplace) and ask that they self-quarantine  Currently, close contact is defined as being within 6 feet for 15 minutes or more from the period 24 hours starting 48 hours before symptom onset to the time at which the patient went into isolation  Close contacts of patients diagnosed with COVID-19 should be instructed by the patient to self-quarantine for 14 days from the last time of their last contact with the patient       Source: [employer]

## 2021-05-12 NOTE — PROGRESS NOTES
COVID-19 Outpatient Progress Note    Assessment/Plan:    Problem List Items Addressed This Visit        Other    COVID-19 virus infection - Primary     12May: Day 25  Patient reports that his shortness of breath has remained stable and is relieved with albuterol inhaler use  He states he is only using the albuterol inhaler about twice per day now  Will follow-up with patient on 05/14/2021  Disposition:     Patient was previously cleared to return from isolation  I have spent 15 minutes directly with the patient  Encounter provider DILMA Vargas    Provider located at [address] [place] [phone]    Recent Visits  Date Type Provider Dept   05/10/21 Telemedicine DILMA [facility]   05/08/21 Telemedicine [facility]   05/06/21 Telemedicine DILMA James [facility]   05/05/21 Telemedicine Elier [facility]   Showing recent visits within past 7 days and meeting all other requirements     Today's Visits  Date Type Provider Dept   05/12/21 Telemedicine [facility]   Showing today's visits and meeting all other requirements     Future Appointments  No visits were found meeting these conditions  Showing future appointments within next 150 days and meeting all other requirements      This virtual check-in was done via [employer] Duo and patient was informed that this is not a secure, HIPAA-compliant platform  He agrees to proceed  Patient agrees to participate in a virtual check in via telephone or video visit instead of presenting to the office to address urgent/immediate medical needs  Patient is aware this is a billable service  After connecting through [facility], the patient was identified by name and date of birth   Rosa Isela Garcia was informed that this was a telemedicine visit and that the exam was being conducted confidentially over secure lines  My office door was closed  No one else was in the room  Sommer Lee acknowledged consent and understanding of privacy and security of the telemedicine visit  I informed the patient that I have reviewed his record in Epic and presented the opportunity for him to ask any questions regarding the visit today  The patient agreed to participate  Subjective:   Sommer Lee is a 31 y o  male who has been screened for COVID-19  Symptom change since last report: improving  Patient's symptoms include nasal congestion, cough (non-productive ), shortness of breath and chest tightness  Patient denies fever, chills, fatigue, malaise, rhinorrhea, sore throat, anosmia, loss of taste, abdominal pain, nausea, vomiting, diarrhea, myalgias and headaches  Staying home and isolating themselves?: has not      Taking care not to share personal items?: is not      Cleaning all surfaces that are touched often?: is not      Wearing a mask when leaving room?: is not      Date of symptom onset: 4/24/2021  Date of positive COVID-19 PCR: 5/2/2021    Patient had a chest x-ray performed on 5/10 which was negative for pneumonia or any other abnormalities  Patient is reporting a nonproductive cough and nasal congestion  He reports that his headaches, loss of smell and loss of taste have resolved  He also feels like he is back at his baseline energy level  He is still reporting intermittent shortness of breath but states he only needs to use the albuterol inhaler about twice a day  He denies any fevers or chills  Lab Results   Component Value Date    SARSCOV2 Positive (A) 05/02/2021     History reviewed  No pertinent past medical history  History reviewed  No pertinent surgical history    Current Outpatient Medications   Medication Sig Dispense Refill    albuterol (PROVENTIL HFA,VENTOLIN HFA) 90 mcg/act inhaler TAKE 2 PUFFS BY MOUTH EVERY 6 HOURS AS NEEDED FOR WHEEZE OR FOR SHORTNESS OF BREATH 6 7 g 0    budesonide (PULMICORT) 90 MCG/ACT inhaler Inhale 1 puff 2 (two) times a day 1 each 0    fluticasone (FLONASE) 50 mcg/act nasal spray 2 sprays into each nostril daily 16 g 0    predniSONE 10 mg tablet 4 x 3 days, 3 x 1, 2 x 1, 1 x 1 18 tablet 0    diclofenac (VOLTAREN) 75 mg EC tablet Take 1 tablet (75 mg total) by mouth 2 (two) times a day (Patient not taking: Reported on 5/2/2021) 30 tablet 0    Promethazine-DM (PHENERGAN-DM) 6 25-15 mg/5 mL oral syrup Take 2 5 mL by mouth 4 (four) times a day as needed for cough (Patient not taking: Reported on 5/12/2021) 118 mL 0    tiZANidine (ZANAFLEX) 4 mg tablet Take 1 tablet (4 mg total) by mouth every 8 (eight) hours as needed for muscle spasms (Patient not taking: Reported on 5/2/2021) 30 tablet 0     Current Facility-Administered Medications   Medication Dose Route Frequency Provider Last Rate Last Admin    methylPREDNISolone acetate (DEPO-MEDROL) injection 80 mg  80 mg Intramuscular Once Jesi Schuler MD         No Known Allergies    Review of Systems   Constitutional: Negative for chills, fatigue and fever  HENT: Positive for congestion  Negative for rhinorrhea and sore throat  Eyes: Negative  Respiratory: Positive for cough (non-productive ), chest tightness and shortness of breath  Cardiovascular: Negative  Gastrointestinal: Negative for abdominal pain, diarrhea, nausea and vomiting  Endocrine: Negative  Genitourinary: Negative  Musculoskeletal: Negative for myalgias  Skin: Negative  Allergic/Immunologic: Negative  Neurological: Negative for headaches  Hematological: Negative  Psychiatric/Behavioral: Negative  Objective:    Vitals:    05/12/21 0800   Temp: (!) 96 1 °F (35 6 °C)   TempSrc: Oral   Weight: 88 5 kg (195 lb)       Physical Exam  Vitals reviewed: limited due to Google Duo exam    Constitutional:       General: He is not in acute distress  Appearance: He is well-developed   He is not ill-appearing  Neurological:      Mental Status: He is alert  VIRTUAL VISIT DISCLAIMER    Enoch Mcnair acknowledges that he has consented to an online visit or consultation  He understands that the online visit is based solely on information provided by him, and that, in the absence of a face-to-face physical evaluation by the physician, the diagnosis he receives is both limited and provisional in terms of accuracy and completeness  This is not intended to replace a full medical face-to-face evaluation by the physician  Enoch Mcnair understands and accepts these terms

## 2021-05-14 ENCOUNTER — TELEMEDICINE (OUTPATIENT)
Dept: FAMILY MEDICINE CLINIC | Facility: CLINIC | Age: 36
End: 2021-05-14
Payer: COMMERCIAL

## 2021-05-14 DIAGNOSIS — U07.1 COVID-19 VIRUS INFECTION: Primary | ICD-10-CM

## 2021-05-14 PROCEDURE — 1036F TOBACCO NON-USER: CPT | Performed by: NURSE PRACTITIONER

## 2021-05-14 PROCEDURE — 99212 OFFICE O/P EST SF 10 MIN: CPT | Performed by: NURSE PRACTITIONER

## 2021-05-14 NOTE — ASSESSMENT & PLAN NOTE
14May:  Patient's symptoms appear stable at this point  Patient does plan on returning to work on 05/17/2021  No further follow-up is necessary at this point

## 2021-05-14 NOTE — PROGRESS NOTES
COVID-19 Outpatient Progress Note    Assessment/Plan:    Problem List Items Addressed This Visit        Other    COVID-19 virus infection - Primary     14May:  Patient's symptoms appear stable at this point  Patient does plan on returning to work on 05/17/2021  No further follow-up is necessary at this point  Disposition:     Patient was previously released to return from isolation  I have spent 15 minutes directly with the patient  Encounter provider DILMA Espinal    Provider located at [address] [place] [id number]    Recent Visits  Date Type Provider Dept   05/12/21 Telemedicine DILMA [facility]   05/10/21 [facility] DILMA James Pg [facility]   05/08/21 [facility]   Showing recent visits within past 7 days and meeting all other requirements     Today's Visits  Date Type Provider Dept   05/14/21 Telemedicine Deanna James [facility]   Showing today's visits and meeting all other requirements     Future Appointments  No visits were found meeting these conditions  Showing future appointments within next 150 days and meeting all other requirements      This virtual check-in was done via [employer] Duo and patient was informed that this is not a secure, HIPAA-compliant platform  He agrees to proceed  Patient agrees to participate in a virtual check in via telephone or video visit instead of presenting to the office to address urgent/immediate medical needs  Patient is aware this is a billable service  After connecting through [facility], the patient was identified by name and date of birth  Rahel Cummings was informed that this was a telemedicine visit and that the exam was being conducted confidentially over secure lines  My office door was closed  No one else was in the room   Rahel Cummings acknowledged consent and understanding of privacy and security of the telemedicine visit  I informed the patient that I have reviewed his record in Epic and presented the opportunity for him to ask any questions regarding the visit today  The patient agreed to participate  Subjective:   Hanh Meyer is a 31 y o  male who has been screened for COVID-19  Symptom change since last report: improving  Patient's symptoms include fatigue, malaise, nasal congestion, cough, shortness of breath, chest tightness and headache (intermittent)  Patient denies fever, chills, rhinorrhea, sore throat, anosmia, loss of taste, abdominal pain, nausea, vomiting, diarrhea and myalgias  Staying home and isolating themselves?: has not      Taking care not to share personal items?: is not      Cleaning all surfaces that are touched often?: is not      Wearing a mask when leaving room?: is not      Date of symptom onset: 4/24/2021  Date of positive COVID-19 PCR: 5/2/2021    Patient is reporting symptoms of intermittent productive cough, nasal congestion, intermittent headaches, and increased fatigue  The patient does report that overall his congestion and headaches have improved greatly over the past 2 days  Patient is currently taking DayQuil and it is helping to relieve his cough  The patient is still reporting intermittent shortness of breath however, he states he is still using the budesonide inhaler b i d  as prescribed and the albuterol inhaler 1-2 times per day and this has been controlling his symptoms  Lab Results   Component Value Date    SARSCOV2 Positive (A) 05/02/2021     No past medical history on file  No past surgical history on file    Current Outpatient Medications   Medication Sig Dispense Refill    albuterol (PROVENTIL HFA,VENTOLIN HFA) 90 mcg/act inhaler TAKE 2 PUFFS BY MOUTH EVERY 6 HOURS AS NEEDED FOR WHEEZE OR FOR SHORTNESS OF BREATH 6 7 g 0    budesonide (PULMICORT) 90 MCG/ACT inhaler Inhale 1 puff 2 (two) times a day 1 each 0    diclofenac (VOLTAREN) 75 mg EC tablet Take 1 tablet (75 mg total) by mouth 2 (two) times a day (Patient not taking: Reported on 5/2/2021) 30 tablet 0    fluticasone (FLONASE) 50 mcg/act nasal spray 2 sprays into each nostril daily 16 g 0    predniSONE 10 mg tablet 4 x 3 days, 3 x 1, 2 x 1, 1 x 1 18 tablet 0    Promethazine-DM (PHENERGAN-DM) 6 25-15 mg/5 mL oral syrup Take 2 5 mL by mouth 4 (four) times a day as needed for cough (Patient not taking: Reported on 5/12/2021) 118 mL 0    tiZANidine (ZANAFLEX) 4 mg tablet Take 1 tablet (4 mg total) by mouth every 8 (eight) hours as needed for muscle spasms (Patient not taking: Reported on 5/2/2021) 30 tablet 0     Current Facility-Administered Medications   Medication Dose Route Frequency Provider Last Rate Last Admin    methylPREDNISolone acetate (DEPO-MEDROL) injection 80 mg  80 mg Intramuscular Once Aleida Hensley MD         No Known Allergies    Review of Systems   Constitutional: Positive for fatigue  Negative for chills and fever  HENT: Positive for congestion  Negative for rhinorrhea and sore throat  Eyes: Negative  Respiratory: Positive for cough, chest tightness and shortness of breath  Cardiovascular: Negative  Gastrointestinal: Negative for abdominal pain, diarrhea, nausea and vomiting  Endocrine: Negative  Genitourinary: Negative  Musculoskeletal: Negative for myalgias  Skin: Negative  Allergic/Immunologic: Negative  Neurological: Positive for headaches (intermittent)  Hematological: Negative  Psychiatric/Behavioral: Negative  Objective: There were no vitals filed for this visit  Physical Exam  Vitals reviewed: limited due to Google Duo exam    Constitutional:       General: He is not in acute distress  Appearance: Normal appearance  He is not ill-appearing  Neurological:      Mental Status: He is alert         VIRTUAL VISIT DISCLAIMER    Kendra Damian acknowledges that he has consented to an online visit or consultation  He understands that the online visit is based solely on information provided by him, and that, in the absence of a face-to-face physical evaluation by the physician, the diagnosis he receives is both limited and provisional in terms of accuracy and completeness  This is not intended to replace a full medical face-to-face evaluation by the physician  Scotty Alcazar understands and accepts these terms

## 2021-05-14 NOTE — PATIENT INSTRUCTIONS
Problem List Items Addressed This Visit        Other    COVID-19 virus infection - Primary     14May:  Patient's symptoms appear stable at this point  Patient does plan on returning to work on 05/17/2021  No further follow-up is necessary at this point  [address]    Your healthcare provider and/or public health staff have evaluated you and have determined that you do not need to remain in the hospital at this time  At this time you can be isolated at home where you will be monitored by staff from your local or state health department  You should carefully follow the prevention and isolation steps below until a healthcare provider or local or state health department says that you can return to your normal activities  Stay home except to get medical care    People who are mildly ill with COVID-19 are able to isolate at home during their illness  You should restrict activities outside your home, except for getting medical care  Do not go to work, school, or public areas  Avoid using public transportation, ride-sharing, or taxis  Separate yourself from other people and animals in your home    People: As much as possible, you should stay in a specific room and away from other people in your home  Also, you should use a separate bathroom, if available  Animals: You should restrict contact with pets and other animals while you are sick with COVID-19, just like you would around other people  Although there have not been reports of pets or other animals becoming sick with COVID-19, it is still recommended that people sick with COVID-19 limit contact with animals until more information is known about the virus  When possible, have another member of your household care for your animals while you are sick  If you are sick with COVID-19, avoid contact with your pet, including petting, snuggling, being kissed or licked, and sharing food   If you must care for your pet or be around animals while you are sick, wash your hands before and after you interact with pets and wear a facemask  See COVID-19 and Animals for more information  Call ahead before visiting your doctor    If you have a medical appointment, call the healthcare provider and tell them that you have or may have COVID-19  This will help the healthcare providers office take steps to keep other people from getting infected or exposed  Wear a facemask    You should wear a facemask when you are around other people (e g , sharing a room or vehicle) or pets and before you enter a healthcare providers office  If you are not able to wear a facemask (for example, because it causes trouble breathing), then people who live with you should not stay in the same room with you, or they should wear a facemask if they enter your room  Cover your coughs and sneezes    Cover your mouth and nose with a tissue when you cough or sneeze  Throw used tissues in a lined trash can  Immediately wash your hands with soap and water for at least 20 seconds or, if soap and water are not available, clean your hands with an alcohol-based hand  that contains at least 60% alcohol  Clean your hands often    Wash your hands often with soap and water for at least 20 seconds, especially after blowing your nose, coughing, or sneezing; going to the bathroom; and before eating or preparing food  If soap and water are not readily available, use an alcohol-based hand  with at least 60% alcohol, covering all surfaces of your hands and rubbing them together until they feel dry  Soap and water are the best option if hands are visibly dirty  Avoid touching your eyes, nose, and mouth with unwashed hands  Avoid sharing personal household items    You should not share dishes, drinking glasses, cups, eating utensils, towels, or bedding with other people or pets in your home   After using these items, they should be washed thoroughly with soap and water  Clean all high-touch surfaces everyday    High touch surfaces include counters, tabletops, doorknobs, bathroom fixtures, toilets, phones, keyboards, tablets, and bedside tables  Also, clean any surfaces that may have blood, stool, or body fluids on them  Use a household cleaning spray or wipe, according to the label instructions  Labels contain instructions for safe and effective use of the cleaning product including precautions you should take when applying the product, such as wearing gloves and making sure you have good ventilation during use of the product  Monitor your symptoms    Seek prompt medical attention if your illness is worsening (e g , difficulty breathing)  Before seeking care, call your healthcare provider and tell them that you have, or are being evaluated for, COVID-19  Put on a facemask before you enter the facility  These steps will help the healthcare providers office to keep other people in the office or waiting room from getting infected or exposed  Ask your healthcare provider to call the local or [facility] health department  Persons who are placed under active monitoring or facilitated self-monitoring should follow instructions provided by their local health department or occupational health professionals, as appropriate  If you have a medical emergency and need to call 911, notify the dispatch personnel that you have, or are being evaluated for COVID-19  If possible, put on a facemask before emergency medical services arrive      Discontinuing home isolation    Patients with confirmed COVID-19 should remain under home isolation precautions until the following conditions are met:   - They have had no fever for at least 24 hours (that is one full day of no fever without the use medicine that reduces fevers)  AND  - other symptoms have improved (for example, when their cough or shortness of breath have improved)  AND  - If had mild or moderate illness, at least 10 days have passed since their symptoms first appeared or if severe illness (needed oxygen) or immunosuppressed, at least 20 days have passed since symptoms first appeared  Patients with confirmed COVID-19 should also notify close contacts (including their workplace) and ask that they self-quarantine  Currently, close contact is defined as being within 6 feet for 15 minutes or more from the period 24 hours starting 48 hours before symptom onset to the time at which the patient went into isolation  Close contacts of patients diagnosed with COVID-19 should be instructed by the patient to self-quarantine for 14 days from the last time of their last contact with the patient       Source: [employer]

## 2021-05-24 DIAGNOSIS — H69.83 EUSTACHIAN TUBE DYSFUNCTION, BILATERAL: ICD-10-CM

## 2021-05-28 DIAGNOSIS — U07.1 COVID-19 VIRUS INFECTION: ICD-10-CM

## 2021-05-28 RX ORDER — BUDESONIDE 90 UG/1
AEROSOL, POWDER RESPIRATORY (INHALATION)
Qty: 1 EACH | Refills: 3 | Status: SHIPPED | OUTPATIENT
Start: 2021-05-28

## 2021-05-28 RX ORDER — FLUTICASONE PROPIONATE 50 MCG
SPRAY, SUSPENSION (ML) NASAL
Qty: 16 ML | OUTPATIENT
Start: 2021-05-28

## 2021-05-29 DIAGNOSIS — U07.1 COVID-19 VIRUS INFECTION: ICD-10-CM

## 2021-06-01 ENCOUNTER — TELEPHONE (OUTPATIENT)
Dept: FAMILY MEDICINE CLINIC | Facility: CLINIC | Age: 36
End: 2021-06-01

## 2021-06-01 RX ORDER — ALBUTEROL SULFATE 90 UG/1
AEROSOL, METERED RESPIRATORY (INHALATION)
Qty: 8 G | Refills: 3 | Status: SHIPPED | OUTPATIENT
Start: 2021-06-01

## 2021-06-01 NOTE — TELEPHONE ENCOUNTER
I called and spoke to pt  He states he is feeling much better but he still has a cough from time to time and he uses the inhaler every other day and only once per day  He states he does still get occasional SOB with NO activity and that is when he uses it  I did advise he make a appointment if this continues but please advise if there is anything else you would like the patient to do

## 2021-06-01 NOTE — TELEPHONE ENCOUNTER
----- Message from Cherry Arana, [address]  sent at 6/1/2021  8:00 AM EDT -----  Please inquire with patient how often he is needing to use the Albuterol inhaler  If he is still needing to use the inhaler more than once per day I would like to see him for an office visit

## 2021-08-03 ENCOUNTER — IMMUNIZATIONS (OUTPATIENT)
Dept: FAMILY MEDICINE CLINIC | Facility: HOSPITAL | Age: 36
End: 2021-08-03

## 2021-08-03 DIAGNOSIS — Z23 ENCOUNTER FOR IMMUNIZATION: Primary | ICD-10-CM

## 2021-08-03 PROCEDURE — 91300 SARS-COV-2 / COVID-19 MRNA VACCINE (PFIZER-BIONTECH) 30 MCG: CPT

## 2021-08-03 PROCEDURE — 0001A SARS-COV-2 / COVID-19 MRNA VACCINE (PFIZER-BIONTECH) 30 MCG: CPT

## 2021-08-24 ENCOUNTER — IMMUNIZATIONS (OUTPATIENT)
Dept: FAMILY MEDICINE CLINIC | Facility: HOSPITAL | Age: 36
End: 2021-08-24

## 2021-08-24 DIAGNOSIS — Z23 ENCOUNTER FOR IMMUNIZATION: Primary | ICD-10-CM

## 2021-08-24 PROCEDURE — 91300 SARS-COV-2 / COVID-19 MRNA VACCINE (PFIZER-BIONTECH) 30 MCG: CPT

## 2021-08-24 PROCEDURE — 0002A SARS-COV-2 / COVID-19 MRNA VACCINE (PFIZER-BIONTECH) 30 MCG: CPT

## 2022-11-01 ENCOUNTER — HOSPITAL ENCOUNTER (EMERGENCY)
Facility: HOSPITAL | Age: 37
Discharge: HOME | End: 2022-11-01
Attending: EMERGENCY MEDICINE

## 2022-11-01 ENCOUNTER — APPOINTMENT (EMERGENCY)
Dept: RADIOLOGY | Facility: HOSPITAL | Age: 37
End: 2022-11-01

## 2022-11-01 VITALS
OXYGEN SATURATION: 97 % | RESPIRATION RATE: 17 BRPM | TEMPERATURE: 97.8 F | DIASTOLIC BLOOD PRESSURE: 60 MMHG | SYSTOLIC BLOOD PRESSURE: 105 MMHG | HEART RATE: 58 BPM

## 2022-11-01 DIAGNOSIS — E86.0 DEHYDRATION: Primary | ICD-10-CM

## 2022-11-01 DIAGNOSIS — R53.1 WEAKNESS: ICD-10-CM

## 2022-11-01 LAB
2HR DELTA HS TROPONIN: >0 NG/L
ALBUMIN SERPL BCP-MCNC: 3.2 G/DL (ref 3.5–5)
ALP SERPL-CCNC: 75 U/L (ref 46–116)
ALT SERPL W P-5'-P-CCNC: 71 U/L (ref 12–78)
ANION GAP SERPL CALCULATED.3IONS-SCNC: 9 MMOL/L (ref 4–13)
AST SERPL W P-5'-P-CCNC: 42 U/L (ref 5–45)
ATRIAL RATE: 55 BPM
BASOPHILS # BLD AUTO: 0.02 THOUSANDS/ÂΜL (ref 0–0.1)
BASOPHILS NFR BLD AUTO: 0 % (ref 0–1)
BILIRUB SERPL-MCNC: 0.68 MG/DL (ref 0.2–1)
BUN SERPL-MCNC: 25 MG/DL (ref 5–25)
CALCIUM ALBUM COR SERPL-MCNC: 9.4 MG/DL (ref 8.3–10.1)
CALCIUM SERPL-MCNC: 8.8 MG/DL (ref 8.3–10.1)
CARDIAC TROPONIN I PNL SERPL HS: 2 NG/L
CARDIAC TROPONIN I PNL SERPL HS: <2 NG/L
CHLORIDE SERPL-SCNC: 106 MMOL/L (ref 96–108)
CO2 SERPL-SCNC: 25 MMOL/L (ref 21–32)
CREAT SERPL-MCNC: 1.42 MG/DL (ref 0.6–1.3)
EOSINOPHIL # BLD AUTO: 0.08 THOUSAND/ÂΜL (ref 0–0.61)
EOSINOPHIL NFR BLD AUTO: 2 % (ref 0–6)
ERYTHROCYTE [DISTWIDTH] IN BLOOD BY AUTOMATED COUNT: 12.4 % (ref 11.6–15.1)
GFR SERPL CREATININE-BSD FRML MDRD: 63 ML/MIN/1.73SQ M
GLUCOSE SERPL-MCNC: 160 MG/DL (ref 65–140)
HCT VFR BLD AUTO: 36.6 % (ref 36.5–49.3)
HGB BLD-MCNC: 12.5 G/DL (ref 12–17)
IMM GRANULOCYTES # BLD AUTO: 0.01 THOUSAND/UL (ref 0–0.2)
IMM GRANULOCYTES NFR BLD AUTO: 0 % (ref 0–2)
LYMPHOCYTES # BLD AUTO: 3.47 THOUSANDS/ÂΜL (ref 0.6–4.47)
LYMPHOCYTES NFR BLD AUTO: 68 % (ref 14–44)
MCH RBC QN AUTO: 31.2 PG (ref 26.8–34.3)
MCHC RBC AUTO-ENTMCNC: 34.2 G/DL (ref 31.4–37.4)
MCV RBC AUTO: 91 FL (ref 82–98)
MONOCYTES # BLD AUTO: 0.38 THOUSAND/ÂΜL (ref 0.17–1.22)
MONOCYTES NFR BLD AUTO: 8 % (ref 4–12)
NEUTROPHILS # BLD AUTO: 1.12 THOUSANDS/ÂΜL (ref 1.85–7.62)
NEUTS SEG NFR BLD AUTO: 22 % (ref 43–75)
NRBC BLD AUTO-RTO: 0 /100 WBCS
P AXIS: 21 DEGREES
PLATELET # BLD AUTO: 284 THOUSANDS/UL (ref 149–390)
PMV BLD AUTO: 9.9 FL (ref 8.9–12.7)
POTASSIUM SERPL-SCNC: 3.7 MMOL/L (ref 3.5–5.3)
PR INTERVAL: 368 MS
PROT SERPL-MCNC: 6.6 G/DL (ref 6.4–8.4)
QRS AXIS: 86 DEGREES
QRSD INTERVAL: 82 MS
QT INTERVAL: 434 MS
QTC INTERVAL: 415 MS
RBC # BLD AUTO: 4.01 MILLION/UL (ref 3.88–5.62)
SODIUM SERPL-SCNC: 140 MMOL/L (ref 135–147)
T WAVE AXIS: 64 DEGREES
VENTRICULAR RATE: 55 BPM
WBC # BLD AUTO: 5.08 THOUSAND/UL (ref 4.31–10.16)

## 2022-11-01 RX ORDER — ONDANSETRON 2 MG/ML
4 INJECTION INTRAMUSCULAR; INTRAVENOUS ONCE
Status: COMPLETED | OUTPATIENT
Start: 2022-11-01 | End: 2022-11-01

## 2022-11-01 RX ORDER — ONDANSETRON 2 MG/ML
1 INJECTION INTRAMUSCULAR; INTRAVENOUS ONCE
Status: COMPLETED | OUTPATIENT
Start: 2022-11-01 | End: 2022-11-01

## 2022-11-01 RX ADMIN — ONDANSETRON 4 MG: 2 INJECTION INTRAMUSCULAR; INTRAVENOUS at 09:36

## 2022-11-01 RX ADMIN — SODIUM CHLORIDE 1000 ML: 0.9 INJECTION, SOLUTION INTRAVENOUS at 09:34

## 2022-11-01 NOTE — Clinical Note
Marcus Lynn was seen and treated in our emergency department on 11/1/2022  Diagnosis:     Syl Dixon  may return to work on return date  He may return on this date: 11/03/2022         If you have any questions or concerns, please don't hesitate to call        Pedro Gillespie MD    ______________________________           _______________          _______________  [facility]                              Date                                Time

## 2022-11-01 NOTE — ED PROVIDER NOTES
History  Chief Complaint   Patient presents with   • Weakness - Generalized     Pt states he took 4 diuretics last night in preparation for a body building comp  Woke up lightheaded and dizzy this morning  Per EMS low SBPs  Pt is a 31 yo male presenting for generalized weakness  No signficant pmh  Pt states he is currently in the process of prepping for a bodybuilding competition and took 4 Xpel diuretic pills yesterday as part of his preparation routine (he has done this before)  He has also been cutting his caloric intake as well  Pt had no symptoms last night after taking the pills  As of this morning pt began experiencing generalized weakness, nausea/vomiting, muscle spasms (specifically his right wrist), left sided numbness  Pt was in the bathroom and began to feel like he was about to pass out, slid to the floor, and his wife call EMS services  Reportedly from EMS pt had low systolic bp en route to the hospital  On exam currently pt appears nauseous but vitals are stable  No focal neurologic deficits  Heart regular rate and rhythm  Lungs clear bilaterally to auscultation  No abdominal tenderness and normoactive bowel sounds  Prior to Admission Medications   Prescriptions Last Dose Informant Patient Reported? Taking?    Promethazine-DM (PHENERGAN-DM) 6 25-15 mg/5 mL oral syrup   No No   Sig: Take 2 5 mL by mouth 4 (four) times a day as needed for cough   Patient not taking: Reported on 2021   Pulmicort Flexhaler 90 MCG/ACT inhaler   No No   Sig: TAKE 1 PUFF BY MOUTH TWICE A DAY   albuterol (PROVENTIL HFA,VENTOLIN HFA) 90 mcg/act inhaler   No No   Sig: TAKE 2 PUFFS BY MOUTH EVERY 6 HOURS AS NEEDED FOR WHEEZE OR FOR SHORTNESS OF BREATH   diclofenac (VOLTAREN) 75 mg EC tablet   No No   Sig: Take 1 tablet (75 mg total) by mouth 2 (two) times a day   Patient not taking: Reported on 2021   fluticasone (FLONASE) 50 mcg/act nasal spray   No No   Si sprays into each nostril daily   predniSONE 10 mg tablet   No No   Sig: 4 x 3 days, 3 x 1, 2 x 1, 1 x 1   tiZANidine (ZANAFLEX) 4 mg tablet   No No   Sig: Take 1 tablet (4 mg total) by mouth every 8 (eight) hours as needed for muscle spasms   Patient not taking: Reported on 5/2/2021      Facility-Administered Medications Last Administration Doses Remaining   methylPREDNISolone acetate (DEPO-MEDROL) injection 80 mg None recorded 1          History reviewed  No pertinent past medical history  History reviewed  No pertinent surgical history  Family History   Problem Relation Age of Onset   • Heart disease Mother    • Heart disease Maternal Grandfather      I have reviewed and agree with the history as documented  E-Cigarette/Vaping   • E-Cigarette Use Never User      E-Cigarette/Vaping Substances   • Nicotine No    • THC No    • CBD No    • Flavoring No      Social History     Tobacco Use   • Smoking status: Never Smoker   • Smokeless tobacco: Never Used   Vaping Use   • Vaping Use: Never used   Substance Use Topics   • Alcohol use: No   • Drug use: No        Review of Systems   Constitutional: Positive for fatigue  HENT: Negative  Eyes: Negative  Respiratory: Negative  Cardiovascular: Negative  Gastrointestinal: Positive for nausea and vomiting  Endocrine: Negative  Genitourinary: Negative  Musculoskeletal:        Muscle spasms   Skin: Negative  Allergic/Immunologic: Negative  Neurological: Positive for dizziness, weakness and light-headedness  Generalized weakness   Hematological: Negative  Psychiatric/Behavioral: Negative          Physical Exam  ED Triage Vitals   Temperature Pulse Respirations Blood Pressure SpO2   11/01/22 0854 11/01/22 0849 11/01/22 0849 11/01/22 0849 11/01/22 0849   97 8 °F (36 6 °C) 57 18 137/65 100 %      Temp Source Heart Rate Source Patient Position - Orthostatic VS BP Location FiO2 (%)   11/01/22 0854 11/01/22 0849 11/01/22 0849 11/01/22 0849 --   Oral Monitor Sitting Right arm       Pain Score       11/01/22 0849       No Pain             Orthostatic Vital Signs  Vitals:    11/01/22 0945 11/01/22 1030 11/01/22 1200 11/01/22 1230   BP: 105/95 105/56 100/52 105/60   Pulse: 58 60 (!) 54 58   Patient Position - Orthostatic VS: Sitting Sitting Sitting        Physical Exam  Vitals and nursing note reviewed  Constitutional:       Appearance: Normal appearance  He is normal weight  HENT:      Head: Normocephalic and atraumatic  Right Ear: External ear normal       Left Ear: External ear normal       Nose: Nose normal       Mouth/Throat:      Mouth: Mucous membranes are moist       Pharynx: Oropharynx is clear  Eyes:      Extraocular Movements: Extraocular movements intact  Conjunctiva/sclera: Conjunctivae normal       Pupils: Pupils are equal, round, and reactive to light  Cardiovascular:      Rate and Rhythm: Normal rate and regular rhythm  Pulses: Normal pulses  Heart sounds: Normal heart sounds  Pulmonary:      Effort: Pulmonary effort is normal       Breath sounds: Normal breath sounds  Abdominal:      General: Abdomen is flat  Bowel sounds are normal       Palpations: Abdomen is soft  Musculoskeletal:         General: Normal range of motion  Cervical back: Normal range of motion and neck supple  Skin:     General: Skin is warm and dry  Capillary Refill: Capillary refill takes less than 2 seconds  Neurological:      General: No focal deficit present  Mental Status: He is alert and oriented to person, place, and time  Psychiatric:         Mood and Affect: Mood normal          Behavior: Behavior normal          Thought Content:  Thought content normal          Judgment: Judgment normal          ED Medications  Medications   ondansetron (FOR EMS ONLY) (ZOFRAN) 4 mg/2 mL injection 4 mg (0 mg Does not apply Given to EMS 11/1/22 0847)   sodium chloride 0 9 % bolus 1,000 mL (0 mL Intravenous Stopped 11/1/22 1019)   ondansetron (ZOFRAN) injection 4 mg (4 mg Intravenous Given 11/1/22 0936)       Diagnostic Studies  Results Reviewed     Procedure Component Value Units Date/Time    HS Troponin I 2hr [[id number]]  (Normal) Collected: 11/01/22 1049    Lab Status: Final result Specimen: Blood from Arm, Left Updated: 11/01/22 1125     hs TnI 2hr 2 ng/L      Delta 2hr hsTnI >0 ng/L     HS Troponin 0hr (reflex protocol) [[id number]]  (Normal) Collected: 11/01/22 0859    Lab Status: Final result Specimen: Blood from Arm, Left Updated: 11/01/22 0940     hs TnI 0hr <2 ng/L     Comprehensive metabolic panel [[id number]]  (Abnormal) Collected: 11/01/22 0859    Lab Status: Final result Specimen: Blood from Arm, Left Updated: 11/01/22 0931     Sodium 140 mmol/L      Potassium 3 7 mmol/L      Chloride 106 mmol/L      CO2 25 mmol/L      ANION GAP 9 mmol/L      BUN 25 mg/dL      Creatinine 1 42 mg/dL      Glucose 160 mg/dL      Calcium 8 8 mg/dL      Corrected Calcium 9 4 mg/dL      AST 42 U/L      ALT 71 U/L      Alkaline Phosphatase 75 U/L      Total Protein 6 6 g/dL      Albumin 3 2 g/dL      Total Bilirubin 0 68 mg/dL      eGFR 63 ml/min/1 73sq m     Narrative:      Yakov guidelines for Chronic Kidney Disease (CKD):   •  Stage 1 with normal or high GFR (GFR > 90 mL/min/1 73 square meters)  •  Stage 2 Mild CKD (GFR = 60-89 mL/min/1 73 square meters)  •  Stage 3A Moderate CKD (GFR = 45-59 mL/min/1 73 square meters)  •  Stage 3B Moderate CKD (GFR = 30-44 mL/min/1 73 square meters)  •  Stage 4 Severe CKD (GFR = 15-29 mL/min/1 73 square meters)  •  Stage 5 End Stage CKD (GFR <15 mL/min/1 73 square meters)  Note: GFR calculation is accurate only with a steady state creatinine    CBC and differential [[id number]]  (Abnormal) Collected: 11/01/22 0859    Lab Status: Final result Specimen: Blood from Arm, Left Updated: 11/01/22 0908     WBC 5 08 Thousand/uL      RBC 4 01 Million/uL      Hemoglobin 12 5 g/dL      Hematocrit 36 6 %      MCV 91 fL      MCH 31 2 pg MCHC 34 2 g/dL      RDW 12 4 %      MPV 9 9 fL      Platelets 340 Thousands/uL      nRBC 0 /100 WBCs      Neutrophils Relative 22 %      Immat GRANS % 0 %      Lymphocytes Relative 68 %      Monocytes Relative 8 %      Eosinophils Relative 2 %      Basophils Relative 0 %      Neutrophils Absolute 1 12 Thousands/µL      Immature Grans Absolute 0 01 Thousand/uL      Lymphocytes Absolute 3 47 Thousands/µL      Monocytes Absolute 0 38 Thousand/µL      Eosinophils Absolute 0 08 Thousand/µL      Basophils Absolute 0 02 Thousands/µL                  CT head wo contrast   Final Result by Andrea Agarwal MD (11/01 1024)      No acute intracranial abnormality  Workstation performed: [id number]         XR chest 1 view portable   Final Result by Lobo Warren MD (11/01 1026)      No acute cardiopulmonary disease  Workstation performed: [id number]               Procedures  ECG 12 Lead Documentation Only    Date/Time: 11/1/2022 1:32 PM  Performed by: Wally Madsen MD  Authorized by: Wally Madsen MD     Indications / Diagnosis:  Generalized weakness  ECG reviewed by me, the ED Provider: yes    Patient location:  ED  Previous ECG:     Previous ECG:  Compared to current    Similarity:  No change  Interpretation:     Interpretation: abnormal      Interpretation comment:  1st degree AV block  Rate:     ECG rate:  55    ECG rate assessment: bradycardic    Rhythm:     Rhythm: sinus rhythm and A-V block    Ectopy:     Ectopy: none    QRS:     QRS axis:  Normal    QRS intervals:  Normal  Conduction:     Conduction: abnormal      Abnormal conduction: 1st degree    ST segments:     ST segments:  Normal  T waves:     T waves: normal            ED Course  ED Course as of 11/01/22 1336   Tue Nov 01, 2022   1240 Checked on patient following labs   States he is feeling much better and would like to return home     1241 Lab workup and imaging were negative              HEART Risk Score    Flowsheet Row Most Recent Value   Heart Score Risk Calculator    History 0 Filed at: 11/01/2022 1312   ECG 0 Filed at: 11/01/2022 1312   Age 0 Filed at: 11/01/2022 1312   Risk Factors 0 Filed at: 11/01/2022 1312   Troponin 0 Filed at: 11/01/2022 1312   HEART Score 0 Filed at: 11/01/2022 1312                                [facility]  Number of Diagnoses or Management Options  Dehydration: new and requires workup  Weakness: new and requires workup  Diagnosis management comments: Pt is 29 yo male presenting for generalized weakness  Ddx: Dehydration, electrolyte abnormality, arrhythmia  Given generalized weakness w/ near syncopal episode plan for cardiac workup w/ ecg, trop, cbc, CMP, cxr, and CT head  Most likely result of dehydration and fasting due to reportedly taking 4 pills of his diuretic and cutting calories for his routine for bodybuilding competition  Recommend increase po intake and hydration and plan for discharge if no findings on labs and imaging          Amount and/or Complexity of Data Reviewed  Clinical lab tests: ordered and reviewed  Tests in the radiology section of CPT®: ordered and reviewed  Tests in the medicine section of CPT®: ordered and reviewed  Review and summarize past medical records: yes  Independent visualization of images, tracings, or specimens: yes    Risk of Complications, Morbidity, and/or Mortality  Presenting problems: low  Diagnostic procedures: low  Management options: low    Patient Progress  Patient progress: stable      Disposition  Final diagnoses:   Weakness   Dehydration     Time reflects when diagnosis was documented in both MDM as applicable and the Disposition within this note     Time User Action Codes Description Comment    11/1/2022  9:49 AM Elie Reyes Add [R53 1] Weakness     11/1/2022 12:39 PM Elie Reyes Add [E86 0] Dehydration     11/1/2022 12:39 PM Ernestina Barth [R53 1] Weakness     11/1/2022 12:39 PM Elie Reyes Modify [E86 0] Dehydration       ED Disposition     ED Disposition   Discharge    Condition   Stable    Date/Time   Tue Nov 1, 2022 12:40 PM    Comment   Adonis Telles discharge to home/self care  Follow-up Information     Follow up With Specialties Details Why Contact Info Additional [address] Emergency Department Emergency Medicine Go to  If symptoms worsen [address] [phone] [address] [facility] [address] Emergency Department, [address], [place], [place], [address]    Misti Fleming MD Family Medicine Go to  for elevated glucose  [address]  [address] [address] [phone]             Discharge Medication List as of 11/1/2022 12:41 PM      CONTINUE these medications which have NOT CHANGED    Details   albuterol (PROVENTIL HFA,VENTOLIN HFA) 90 mcg/act inhaler TAKE 2 PUFFS BY MOUTH EVERY 6 HOURS AS NEEDED FOR WHEEZE OR FOR SHORTNESS OF BREATH, Normal      diclofenac (VOLTAREN) 75 mg EC tablet Take 1 tablet (75 mg total) by mouth 2 (two) times a day, Starting Mon 2/10/2020, Normal      fluticasone (FLONASE) 50 mcg/act nasal spray 2 sprays into each nostril daily, Starting Sun 5/2/2021, Normal      predniSONE 10 mg tablet 4 x 3 days, 3 x 1, 2 x 1, 1 x 1, Normal      Promethazine-DM (PHENERGAN-DM) 6 25-15 mg/5 mL oral syrup Take 2 5 mL by mouth 4 (four) times a day as needed for cough, Starting Mon 5/10/2021, Normal      Pulmicort Flexhaler 90 MCG/ACT inhaler TAKE 1 PUFF BY MOUTH TWICE A DAY, Normal      tiZANidine (ZANAFLEX) 4 mg tablet Take 1 tablet (4 mg total) by mouth every 8 (eight) hours as needed for muscle spasms, Starting Mon 2/10/2020, Normal           No discharge procedures on file  PDMP Review     None           ED Provider  Attending physically available and evaluated Adonis Telles  I managed the patient along with the ED Attending      Electronically Signed by         Luz Alcantar MD  11/01/22 3618

## 2022-11-01 NOTE — ED ATTENDING ATTESTATION
11/1/2022  IJoann MD, saw and evaluated the patient  I have discussed the patient with the resident/non-physician practitioner and agree with the resident's/non-physician practitioner's findings, Plan of Care, and MDM as documented in the resident's/non-physician practitioner's note, except where noted  All available labs and Radiology studies were reviewed  I was present for key portions of any procedure(s) performed by the resident/non-physician practitioner and I was immediately available to provide assistance  At this point I agree with the current assessment done in the Emergency Department  I have conducted an independent evaluation of this patient a history and physical is as follows:    Patient presents to the emergency department chief complaint of generalized weakness  The patient reports he was in his normal state of health until this morning when he woke up and felt poorly  Leading up to today the patient has been preparing for a body building competition next week  The patient reports has been decreasing his caloric intake and yesterday ate only 1000 calories  The patient also reports he has been taking increasing number of diuretic pills that were over-the-counter for several weeks  Yesterday was the 1st time he took for the pills  The patient reports he has been eating a high protein diet  The patient slept well last night but woke this morning and while in the bathroom sitting on the toilet he felt lightheaded and had twitching in both his arms  The patient felt as though he might pass out and slid himself to the floor without injury  The patient called his wife who was able to call 911 to have him brought to the hospital   The patient reports he is generally weak and briefly had tingling in his left arm and left leg  Patient also admits to nausea and vomited 3 times this morning  The patient denies melena, hematochezia, or hematemesis    The patient also complains of a mild headache  The headache is not dissimilar from prior headaches it was gradual in onset  The patient denies any injuries, fevers, cough, chest pain, shortness of breath, or abdominal pain  The patient denies any palpitations  Physical exam demonstrates a male no acute distress  HEENT exam demonstrated dry mucosa  The ears and eyes were normal   The neck was supple and nontender  The lungs were clear with equal breath sounds  The heart was bradycardic and regular  The abdomen is soft and nontender  Chest was nontender  The extremities were symmetric and nontender  The patient was alert oriented x3 with normal strength and sensation all extremities  ED Course     After intravenous fluids the patient stated he felt back to normal   He felt well wants to go home  He was advised to have his blood sugar rechecked because it was elevated today and that he needed to have diabetes ruled out  The patient also advised to return to her normal diet and to return if he worsens or develops any new symptoms  The patient was also told to return if his symptoms recur      Critical Care Time  Procedures

## 2022-11-03 ENCOUNTER — TELEPHONE (OUTPATIENT)
Dept: OTHER | Facility: OTHER | Age: 37
End: 2022-11-03

## 2022-11-03 NOTE — TELEPHONE ENCOUNTER
Patient called today to schedule a follow up Appointment from an ER Visit  Please call Patient back

## 2022-11-07 ENCOUNTER — OFFICE VISIT (OUTPATIENT)
Dept: FAMILY MEDICINE CLINIC | Facility: CLINIC | Age: 37
End: 2022-11-07

## 2022-11-07 VITALS
WEIGHT: 189 LBS | HEART RATE: 60 BPM | TEMPERATURE: 97.7 F | DIASTOLIC BLOOD PRESSURE: 68 MMHG | HEIGHT: 71 IN | OXYGEN SATURATION: 99 % | RESPIRATION RATE: 16 BRPM | BODY MASS INDEX: 26.46 KG/M2 | SYSTOLIC BLOOD PRESSURE: 112 MMHG

## 2022-11-07 DIAGNOSIS — N17.9 AKI (ACUTE KIDNEY INJURY) (HCC): ICD-10-CM

## 2022-11-07 DIAGNOSIS — Z13.1 SCREENING FOR DIABETES MELLITUS: ICD-10-CM

## 2022-11-07 DIAGNOSIS — Z00.00 HEALTHCARE MAINTENANCE: ICD-10-CM

## 2022-11-07 DIAGNOSIS — I44.0 1ST DEGREE AV BLOCK: ICD-10-CM

## 2022-11-07 DIAGNOSIS — R73.01 ELEVATED FASTING GLUCOSE: ICD-10-CM

## 2022-11-07 DIAGNOSIS — Z11.59 ENCOUNTER FOR HEPATITIS C SCREENING TEST FOR LOW RISK PATIENT: ICD-10-CM

## 2022-11-07 DIAGNOSIS — Z11.4 ENCOUNTER FOR SCREENING FOR HIV: ICD-10-CM

## 2022-11-07 DIAGNOSIS — R55 NEAR SYNCOPE: Primary | ICD-10-CM

## 2022-11-07 DIAGNOSIS — G25.81 RESTLESS LEGS SYNDROME: ICD-10-CM

## 2022-11-07 NOTE — PATIENT INSTRUCTIONS
Bradycardia   AMBULATORY CARE:   Bradycardia  is a slow heart rate, usually fewer than 60 beats per minute  A slow heart rate is normal for some people, such as athletes, and needs no treatment  Bradycardia may also be caused by health conditions that do need treatment  Your healthcare provider will tell you what heart rate is too low for you  Other signs and symptoms that may occur with bradycardia:   Tiredness and shortness of breath    Dizziness    Lightheadedness    Confusion    Chest pain    Cool and pale or bluish skin    Call your local emergency number (911 in the [address] [facility]) or have someone call if:   You have new or worsening dizziness, shortness of breath, chest pain, or confusion  Call your doctor or cardiologist if:   You feel lightheaded or faint  Your pulse rate is lower than healthcare providers say it should be, even with treatment  You are more tired than usual, even with treatment  You have questions or concerns about your condition or care  How bradycardia is diagnosed: Your healthcare provider will ask about your symptoms  Tell him or her when they started and how often they happen  Tell him or her how severe your symptoms usually are, and how long they last  He or she will ask what triggers your symptoms and if anything makes them worse or better  He or she may ask if you have a heart condition or take any medicines  Tell your provider if symptoms happen after you take certain medicines  Tell him or her if you have a family history of heart conditions  You may also need any of the following:  Blood tests  may be done to see if you have any heart damage  They may also be used to find the cause of your bradycardia  Echocardiography  is a type of ultrasound  Sound waves are used to create pictures of your heart as it beats  An ECG  records the electrical activity of your heart   It may be used to check for heart damage from a heart attack or problems with the way electrical signals travel through your heart  You may be asked to walk on a treadmill during this test to check if exercise triggers symptoms  CT or MRI pictures  may show problems with your heart that can cause bradycardia  Contrast liquid may be used to help your heart show up better in the pictures  Tell the healthcare provider if you had an allergic reaction to contrast liquid  Do not enter the MRI room with any metal  Metal can cause serious damage  Tell the provider if you have any metal in or on your body  A heart monitor  will be used to track your heart rate and rhythm if you are in the hospital  You may also need to wear it for several days at home  Treatment  may not be needed  Bradycardia is usually treated if it causes symptoms, such as dizziness or fainting  The cause of your bradycardia may need to be treated  For example, you may need treatment for sleep apnea if this is causing your symptoms  Your healthcare provider will talk with you about the benefits and risks of treatment that may be right for you:  Heart medicines  may be given to increase your heart rate  These medicines are given through an IV  A temporary pacemaker  is a short-term treatment in the hospital  The pacemaker is applied to your skin with sticky pads or placed into a vein in your neck or chest  A small pacing device helps keep your heartbeat stable  A permanent pacemaker  is implanted under the skin of your chest or abdomen during surgery  A tiny battery creates electrical impulses that keep your heart rate regular  Manage or prevent bradycardia:   Talk to your healthcare provider about all your current medicines  He or she may change a medicine if it is causing your slow heart rate  Do not  stop taking any medicine unless directed by your provider  Keep a record of your symptoms  Include when you have bradycardia, and what you were doing when it started   Also include anything that made your symptoms better or worse  Bring the record to follow-up visits with your healthcare providers  Do not smoke  Nicotine and other chemicals in cigarettes and cigars can cause heart and lung damage  Ask your healthcare provider for information if you currently smoke and need help to quit  E-cigarettes or smokeless tobacco still contain nicotine  Talk to your healthcare provider before you use these products  Reach or maintain a healthy weight  Your healthcare provider can tell you what a healthy weight is for you  He or she can help you create a weight loss plan if needed  Weight loss can help strengthen your heartbeat  If you have sleep apnea, weight loss may help you breathe more regularly while you sleep  Exercise as directed  Exercise can help strengthen your heart  It can also help you manage your weight and improve your sleep  Your healthcare provider can help you create an exercise plan  He or she may recommend 30 to 40 minutes of exercise most days of the week  Eat a variety of healthy foods  Healthy foods include fruits, vegetables, whole-grain breads and cereals, low-fat dairy products, lean meats, fish, and cooked beans  Depending on the cause of your bradycardia, your healthcare provider may recommend a heart healthy diet  This diet is low in sodium (salt) and unhealthy fats  A dietitian or your healthcare provider can help you create a heart healthy diet  Follow up with your doctor or cardiologist as directed:  Write down your questions so you remember to ask them during your visits  You may need to see specialists for more treatment  If you have a pacemaker, your cardiologist needs to make sure that it is working as it should  For more information:   [address]   Phone: [phone]  Web Address: https://www [employer]/  [facility] [address] 2022 Information is for End User's use only and may not be sold, redistributed or otherwise used for commercial purposes  All illustrations and images included in CareNotes® are the copyrighted property of [employer]  or [place]  The above information is an  only  It is not intended as medical advice for individual conditions or treatments  Talk to your doctor, nurse or pharmacist before following any medical regimen to see if it is safe and effective for you  Acute Kidney Injury   AMBULATORY CARE:   Acute kidney injury (JEANE) is also called acute kidney failure, or acute renal failure  JEANE happens when your kidneys suddenly stop working correctly  Normally, the kidneys remove fluid, chemicals, and waste from your blood  These wastes are turned into urine by your kidneys  JEANE usually happens over hours or days  When you have JEANE, your kidneys do not remove the waste, chemicals, or extra fluid from your body  A normal amount of urine is not produced  JEANE is usually temporary, but it may become a chronic kidney condition  Causes of JEANE:   Decreased blood flow to the kidney, such as from hypercalcemia (high blood calcium level) or severe heart disease     A disease or condition that affects the kidneys, such as hypertension (high blood pressure) or diabetes     A blockage in the kidney or ureter, such as a kidney or bladder stone, enlarged prostate, or tumor    Common symptoms include the following: You may not have any symptoms with early or mild JEANE  As JEANE progresses, you may have any of the following:  Decrease in the amount of urine or no urination    Swelling in your arms, legs, or feet     Weakness, drowsiness, or no appetite    Nausea, flank pain, muscle twitching or muscle cramps    Itchy skin, or your, breath or body smells like urine    Behavior changes, confusion, disorientation, or seizures    Call 911 if:   You have sudden chest pain or trouble breathing  Seek care immediately if:   Your symptoms get worse         Contact your healthcare provider if:   Your symptoms return  Your blood sugar or blood pressure level is not within the range your healthcare provider recommends  You have questions or concerns about your condition or care  Treatment for JEANE  depends upon the cause of your acute kidney injury and how severe it is  Usually, JEANE will be monitored in the hospital  If you have mild JEANE, you may be able to go home to recover  Your healthcare providers will treat the cause of your JEANE  You may need IV fluids if your JEANE was caused by little or no fluid in your body  You may need dialysis to remove waste and extra fluid from your body  Nutrition:  Your healthcare provider may tell you to eat food low in sodium (salt), potassium, phosphorus, or protein  A dietitian can help you plan your meals  Drink liquids as directed: Your healthcare provider may recommend that you drink a certain amount of liquids  This will help your kidneys work better and decrease your risk for dehydration  Ask how much liquid to drink each day and which liquids are best for you  What you can do to manage and prevent JEANE:   Monitor and manage other health conditions  such as diabetes, high blood pressure, or heart disease  These conditions increase your risk for acute kidney injury  Take your medicines for these conditions as directed  Also, monitor your blood sugar and blood pressure levels as directed  Contact your healthcare provider if your levels are not in the range he or she says it should be  Talk to your healthcare provider before you take over-the-counter-medicine  NSAIDs, stomach medicine, or laxatives may harm your kidneys and increase your risk for acute kidney injury  If it is okay to take the medicine, follow the directions on the package  Do not take more than directed  Tell healthcare providers you have had acute kidney injury  before you get contrast liquid for an x-ray or CT scan   Your healthcare provider may give you medicine to prevent kidney problems caused by the liquid  Follow up with your doctor as directed:  Write down your questions so you remember to ask them during your visits  © Copyright [address] 2022 Information is for End User's use only and may not be sold, redistributed or otherwise used for commercial purposes  All illustrations and images included in CareNotes® are the copyrighted property of [employer] M [employer]  or [place] [facility]   The above information is an  only  It is not intended as medical advice for individual conditions or treatments  Talk to your doctor, nurse or pharmacist before following any medical regimen to see if it is safe and effective for you

## 2022-11-07 NOTE — PROGRESS NOTES
INTERNAL MEDICINE INITIAL OFFICE VISIT  [facility] - [facility]    NAME: Rebecca Hunter  AGE: 30 y o  SEX: male  : 1985     DATE: 2022     Assessment and Plan:     Problem List Items Addressed This Visit        Cardiovascular and Mediastinum    1st degree AV block           Sinus bradycardia with 1st degree A-V block  Nonspecific T wave abnormality  Abnormal ECG  Confirmed by Nas Oliver ([id number]) on 2022 10:08:58 AM           Relevant Orders    Ambulatory Referral to Cardiology    Near syncope - Primary       Genitourinary    JEANE (acute kidney injury) ([facility] [address] )    Relevant Orders    Comprehensive metabolic panel       Other    Restless legs syndrome    Relevant Orders    TSH, 3rd generation with Free T4 reflex    Elevated fasting glucose     Noted at the ED  repeat blood work and A1c ordered         Relevant Orders    HEMOGLOBIN A1C W/ EAG ESTIMATION      Other Visit Diagnoses     Screening for diabetes mellitus        Relevant Orders    HEMOGLOBIN A1C W/ EAG ESTIMATION    Healthcare maintenance        Relevant Orders    Lipid Panel with Direct LDL reflex    Encounter for screening for HIV        Relevant Orders    HIV 1/2 Antigen/Antibody (4th Generation) w Reflex [facility]    Encounter for hepatitis C screening test for low risk patient        Relevant Orders    Hepatitis C antibody          Return in about 1 year (around 2023) for yearly physical exam      Chief Complaint:     Chief Complaint   Patient presents with   • Establish Care     New pt       History of Present Illness: The patient presents to the office today to establish care  The patient's past medical history was updated  He was recently seen in the emergency room at which time he sustained a near syncopal episode  The patient currently is training for a weightlifting competition and his taking over-the-counter diuretics as well as cutting back his caloric intake    Blood work in the emergency room showed an elevated creatinine level as well as an elevated glucose level  Repeat blood work will be performed today  The patient continues with the over-the-counter diuretics  The patient is also continuing with some weakness, dizziness and some occasional numbness in his arms and legs  I did caution the patient regarding his method of preparing for his upcoming competition  He is aware that by continuing use of the diuretics he is lowering his sodium level and in some cases this can be detrimental   I will contact him with the repeat blood work  I will also refer the patient to Cardiology due to his abnormal EKG in the emergency room which showed sinus bradycardia which may be the patient's baseline as well as first-degree AV block  He does have a family history of heart disease  The following portions of the patient's history were reviewed and updated as appropriate: allergies, current medications, past family history, past medical history, past social history, past surgical history and problem list      Review of Systems:     Review of Systems   Constitutional: Negative  Negative for fatigue  HENT: Negative  Negative for congestion, postnasal drip, rhinorrhea and trouble swallowing  Eyes: Negative  Negative for visual disturbance  Respiratory: Negative  Negative for choking and shortness of breath  Cardiovascular: Negative  Negative for chest pain  Gastrointestinal: Negative  Endocrine: Negative  Genitourinary: Negative  Musculoskeletal: Negative  Negative for arthralgias, back pain, myalgias and neck pain  Skin: Negative  Neurological: Positive for dizziness, weakness, numbness and headaches  Psychiatric/Behavioral: Negative  Past Medical History:   History reviewed  No pertinent past medical history  Past Surgical History:   History reviewed  No pertinent surgical history       Social History:     Social History     Socioeconomic History   • Marital status: /Civil Union     Spouse name: None   • Number of children: None   • Years of education: None   • Highest education level: None   Occupational History   • None   Tobacco Use   • Smoking status: Never Smoker   • Smokeless tobacco: Never Used   Vaping Use   • Vaping Use: Never used   Substance and Sexual Activity   • Alcohol use: No   • Drug use: No   • Sexual activity: Yes     Partners: Female     Birth control/protection: None, Condom Male   Other Topics Concern   • None   Social History Narrative    CAFFEINE USE :  OCCASIONAL TEA    Lives with wife    House    Full time employment     Social Determinants of Health     Financial Resource Strain: Not on file   Food Insecurity: Not on file   Transportation Needs: Not on file   Physical Activity: Not on file   Stress: Not on file   Social Connections: Not on file   Intimate Partner Violence: Not on file   Housing Stability: Not on file         Family History:     Family History   Problem Relation Age of Onset   • Heart disease Mother    • Heart disease Maternal Grandfather         Current Medications:     Current Outpatient Medications:   •  albuterol (PROVENTIL HFA,VENTOLIN HFA) 90 mcg/act inhaler, TAKE 2 PUFFS BY MOUTH EVERY 6 HOURS AS NEEDED FOR WHEEZE OR FOR SHORTNESS OF BREATH, Disp: 8 g, Rfl: 3  •  fluticasone (FLONASE) 50 mcg/act nasal spray, 2 sprays into each nostril daily, Disp: 16 g, Rfl: 0  •  predniSONE 10 mg tablet, 4 x 3 days, 3 x 1, 2 x 1, 1 x 1, Disp: 18 tablet, Rfl: 0  •  Pulmicort Flexhaler 90 MCG/ACT inhaler, TAKE 1 PUFF BY MOUTH TWICE A DAY, Disp: 1 each, Rfl: 3  No current facility-administered medications for this visit  Allergies:   No Known Allergies     Physical Exam:     /68 (BP Location: Right arm, Patient Position: Sitting)   Pulse 60   Temp 97 7 °F (36 5 °C)   Resp 16   Ht 5' 11" (1 803 m)   Wt 85 7 kg (189 lb)   SpO2 99%   BMI 26 36 kg/m²     Physical Exam  Vitals reviewed     Constitutional:       General: He is not in acute distress  Appearance: Normal appearance  He is well-developed  HENT:      Head: Normocephalic and atraumatic  Right Ear: External ear normal       Left Ear: External ear normal       Nose: Nose normal       Mouth/Throat:      Pharynx: No oropharyngeal exudate  Eyes:      General:         Right eye: No discharge  Left eye: No discharge  Conjunctiva/sclera: Conjunctivae normal       Pupils: Pupils are equal, round, and reactive to light  Neck:      Thyroid: No thyromegaly  Vascular: No JVD  Trachea: No tracheal deviation  Cardiovascular:      Rate and Rhythm: Normal rate and regular rhythm  Pulses: Normal pulses  Heart sounds: Normal heart sounds  No murmur heard  Pulmonary:      Effort: Pulmonary effort is normal  No respiratory distress  Breath sounds: Normal breath sounds  No wheezing  Abdominal:      General: Bowel sounds are normal  There is no distension  Palpations: Abdomen is soft  There is no mass  Tenderness: There is no abdominal tenderness  There is no guarding or rebound  Hernia: No hernia is present  Musculoskeletal:         General: Normal range of motion  Cervical back: Normal range of motion and neck supple  Lymphadenopathy:      Cervical: No cervical adenopathy  Skin:     General: Skin is warm and dry  Capillary Refill: Capillary refill takes less than 2 seconds  Neurological:      General: No focal deficit present  Mental Status: He is alert and oriented to person, place, and time  Mental status is at baseline  Psychiatric:         Mood and Affect: Mood normal          Behavior: Behavior normal          Thought Content: Thought content normal          Judgment: Judgment normal        BMI Counseling: Body mass index is 26 36 kg/m²  Follow-up plan was not completed due to patient refusing BMI follow-up plan   BMI falsely elevated, patient is weight  and has higher muscle mass    Depression Screening and Follow-up Plan: Patient was screened for depression during today's encounter  They screened negative with a PHQ-2 score of 0  Data:     Laboratory Results: I have personally reviewed the pertinent laboratory results/reports   Radiology/Other Diagnostic Testing Results: I have personally reviewed pertinent reports  Patient Instructions       Bradycardia   AMBULATORY CARE:   Bradycardia  is a slow heart rate, usually fewer than 60 beats per minute  A slow heart rate is normal for some people, such as athletes, and needs no treatment  Bradycardia may also be caused by health conditions that do need treatment  Your healthcare provider will tell you what heart rate is too low for you  Other signs and symptoms that may occur with bradycardia:   Tiredness and shortness of breath    Dizziness    Lightheadedness    Confusion    Chest pain    Cool and pale or bluish skin    Call your local emergency number (911 in the [address] [facility]) or have someone call if:   You have new or worsening dizziness, shortness of breath, chest pain, or confusion  Call your doctor or cardiologist if:   You feel lightheaded or faint  Your pulse rate is lower than healthcare providers say it should be, even with treatment  You are more tired than usual, even with treatment  You have questions or concerns about your condition or care  How bradycardia is diagnosed: Your healthcare provider will ask about your symptoms  Tell him or her when they started and how often they happen  Tell him or her how severe your symptoms usually are, and how long they last  He or she will ask what triggers your symptoms and if anything makes them worse or better  He or she may ask if you have a heart condition or take any medicines  Tell your provider if symptoms happen after you take certain medicines  Tell him or her if you have a family history of heart conditions   You may also need any of the following:  Blood tests  may be done to see if you have any heart damage  They may also be used to find the cause of your bradycardia  Echocardiography  is a type of ultrasound  Sound waves are used to create pictures of your heart as it beats  An ECG  records the electrical activity of your heart  It may be used to check for heart damage from a heart attack or problems with the way electrical signals travel through your heart  You may be asked to walk on a treadmill during this test to check if exercise triggers symptoms  CT or MRI pictures  may show problems with your heart that can cause bradycardia  Contrast liquid may be used to help your heart show up better in the pictures  Tell the healthcare provider if you had an allergic reaction to contrast liquid  Do not enter the MRI room with any metal  Metal can cause serious damage  Tell the provider if you have any metal in or on your body  A heart monitor  will be used to track your heart rate and rhythm if you are in the hospital  You may also need to wear it for several days at home  Treatment  may not be needed  Bradycardia is usually treated if it causes symptoms, such as dizziness or fainting  The cause of your bradycardia may need to be treated  For example, you may need treatment for sleep apnea if this is causing your symptoms  Your healthcare provider will talk with you about the benefits and risks of treatment that may be right for you:  Heart medicines  may be given to increase your heart rate  These medicines are given through an IV  A temporary pacemaker  is a short-term treatment in the hospital  The pacemaker is applied to your skin with sticky pads or placed into a vein in your neck or chest  A small pacing device helps keep your heartbeat stable  A permanent pacemaker  is implanted under the skin of your chest or abdomen during surgery  A tiny battery creates electrical impulses that keep your heart rate regular         Manage or prevent bradycardia:   Talk to your healthcare provider about all your current medicines  He or she may change a medicine if it is causing your slow heart rate  Do not  stop taking any medicine unless directed by your provider  Keep a record of your symptoms  Include when you have bradycardia, and what you were doing when it started  Also include anything that made your symptoms better or worse  Bring the record to follow-up visits with your healthcare providers  Do not smoke  Nicotine and other chemicals in cigarettes and cigars can cause heart and lung damage  Ask your healthcare provider for information if you currently smoke and need help to quit  E-cigarettes or smokeless tobacco still contain nicotine  Talk to your healthcare provider before you use these products  Reach or maintain a healthy weight  Your healthcare provider can tell you what a healthy weight is for you  He or she can help you create a weight loss plan if needed  Weight loss can help strengthen your heartbeat  If you have sleep apnea, weight loss may help you breathe more regularly while you sleep  Exercise as directed  Exercise can help strengthen your heart  It can also help you manage your weight and improve your sleep  Your healthcare provider can help you create an exercise plan  He or she may recommend 30 to 40 minutes of exercise most days of the week  Eat a variety of healthy foods  Healthy foods include fruits, vegetables, whole-grain breads and cereals, low-fat dairy products, lean meats, fish, and cooked beans  Depending on the cause of your bradycardia, your healthcare provider may recommend a heart healthy diet  This diet is low in sodium (salt) and unhealthy fats  A dietitian or your healthcare provider can help you create a heart healthy diet  Follow up with your doctor or cardiologist as directed:  Write down your questions so you remember to ask them during your visits   You may need to see specialists for more treatment  If you have a pacemaker, your cardiologist needs to make sure that it is working as it should  For more information:   [address] [place], [place] [address]Jean Stone   Phone: [phone]  Web Address: https://[employer]/ [address] [facility] 2022 Information is for End User's use only and may not be sold, redistributed or otherwise used for commercial purposes  All illustrations and images included in CareNotes® are the copyrighted property of [employer]  or [facility] [employer]   The above information is an  only  It is not intended as medical advice for individual conditions or treatments  Talk to your doctor, nurse or pharmacist before following any medical regimen to see if it is safe and effective for you  Acute Kidney Injury   AMBULATORY CARE:   Acute kidney injury (JENAE) is also called acute kidney failure, or acute renal failure  JEANE happens when your kidneys suddenly stop working correctly  Normally, the kidneys remove fluid, chemicals, and waste from your blood  These wastes are turned into urine by your kidneys  JEANE usually happens over hours or days  When you have JEANE, your kidneys do not remove the waste, chemicals, or extra fluid from your body  A normal amount of urine is not produced  JEANE is usually temporary, but it may become a chronic kidney condition  Causes of JEANE:   Decreased blood flow to the kidney, such as from hypercalcemia (high blood calcium level) or severe heart disease     A disease or condition that affects the kidneys, such as hypertension (high blood pressure) or diabetes     A blockage in the kidney or ureter, such as a kidney or bladder stone, enlarged prostate, or tumor    Common symptoms include the following: You may not have any symptoms with early or mild JEANE   As JEANE progresses, you may have any of the following:  Decrease in the amount of urine or no urination    Swelling in your arms, legs, or feet     Weakness, drowsiness, or no appetite    Nausea, flank pain, muscle twitching or muscle cramps    Itchy skin, or your, breath or body smells like urine    Behavior changes, confusion, disorientation, or seizures    Call 911 if:   You have sudden chest pain or trouble breathing  Seek care immediately if:   Your symptoms get worse  Contact your healthcare provider if:   Your symptoms return  Your blood sugar or blood pressure level is not within the range your healthcare provider recommends  You have questions or concerns about your condition or care  Treatment for JEANE  depends upon the cause of your acute kidney injury and how severe it is  Usually, JEANE will be monitored in the hospital  If you have mild JEANE, you may be able to go home to recover  Your healthcare providers will treat the cause of your JEANE  You may need IV fluids if your JEAEN was caused by little or no fluid in your body  You may need dialysis to remove waste and extra fluid from your body  Nutrition:  Your healthcare provider may tell you to eat food low in sodium (salt), potassium, phosphorus, or protein  A dietitian can help you plan your meals  Drink liquids as directed: Your healthcare provider may recommend that you drink a certain amount of liquids  This will help your kidneys work better and decrease your risk for dehydration  Ask how much liquid to drink each day and which liquids are best for you  What you can do to manage and prevent JEANE:   Monitor and manage other health conditions  such as diabetes, high blood pressure, or heart disease  These conditions increase your risk for acute kidney injury  Take your medicines for these conditions as directed  Also, monitor your blood sugar and blood pressure levels as directed  Contact your healthcare provider if your levels are not in the range he or she says it should be  Talk to your healthcare provider before you take over-the-counter-medicine    NSAIDs, stomach medicine, or laxatives may harm your kidneys and increase your risk for acute kidney injury  If it is okay to take the medicine, follow the directions on the package  Do not take more than directed  Tell healthcare providers you have had acute kidney injury  before you get contrast liquid for an x-ray or CT scan  Your healthcare provider may give you medicine to prevent kidney problems caused by the liquid  Follow up with your doctor as directed:  Write down your questions so you remember to ask them during your visits  © Copyright [employer] 2022 Information is for End User's use only and may not be sold, redistributed or otherwise used for commercial purposes  All illustrations and images included in CareNotes® are the copyrighted property of A [employer] M [employer] Inc  or [facility] [employer]   The above information is an  only  It is not intended as medical advice for individual conditions or treatments  Talk to your doctor, nurse or pharmacist before following any medical regimen to see if it is safe and effective for you        [address]

## 2022-11-07 NOTE — ASSESSMENT & PLAN NOTE
Sinus bradycardia with 1st degree A-V block  Nonspecific T wave abnormality  Abnormal ECG  Confirmed by Mathew Yin ([id number]) on 11/1/2022 10:08:58 AM

## 2022-11-20 ENCOUNTER — LAB (OUTPATIENT)
Dept: LAB | Facility: CLINIC | Age: 37
End: 2022-11-20

## 2022-11-20 DIAGNOSIS — Z11.59 ENCOUNTER FOR HEPATITIS C SCREENING TEST FOR LOW RISK PATIENT: ICD-10-CM

## 2022-11-20 DIAGNOSIS — Z13.1 SCREENING FOR DIABETES MELLITUS: ICD-10-CM

## 2022-11-20 DIAGNOSIS — G25.81 RESTLESS LEGS SYNDROME: ICD-10-CM

## 2022-11-20 DIAGNOSIS — Z00.00 HEALTHCARE MAINTENANCE: ICD-10-CM

## 2022-11-20 DIAGNOSIS — N17.9 AKI (ACUTE KIDNEY INJURY) (HCC): ICD-10-CM

## 2022-11-20 DIAGNOSIS — R73.01 ELEVATED FASTING GLUCOSE: ICD-10-CM

## 2022-11-20 DIAGNOSIS — Z11.4 ENCOUNTER FOR SCREENING FOR HIV: ICD-10-CM

## 2022-11-20 LAB
ALBUMIN SERPL BCP-MCNC: 3.1 G/DL (ref 3.5–5)
ALP SERPL-CCNC: 82 U/L (ref 46–116)
ALT SERPL W P-5'-P-CCNC: 104 U/L (ref 12–78)
ANION GAP SERPL CALCULATED.3IONS-SCNC: 3 MMOL/L (ref 4–13)
AST SERPL W P-5'-P-CCNC: 39 U/L (ref 5–45)
BILIRUB SERPL-MCNC: 0.4 MG/DL (ref 0.2–1)
BUN SERPL-MCNC: 16 MG/DL (ref 5–25)
CALCIUM ALBUM COR SERPL-MCNC: 9.7 MG/DL (ref 8.3–10.1)
CALCIUM SERPL-MCNC: 9 MG/DL (ref 8.3–10.1)
CHLORIDE SERPL-SCNC: 112 MMOL/L (ref 96–108)
CHOLEST SERPL-MCNC: 149 MG/DL
CO2 SERPL-SCNC: 28 MMOL/L (ref 21–32)
CREAT SERPL-MCNC: 1.35 MG/DL (ref 0.6–1.3)
EST. AVERAGE GLUCOSE BLD GHB EST-MCNC: 103 MG/DL
GFR SERPL CREATININE-BSD FRML MDRD: 67 ML/MIN/1.73SQ M
GLUCOSE P FAST SERPL-MCNC: 99 MG/DL (ref 65–99)
HBA1C MFR BLD: 5.2 %
HCV AB SER QL: NORMAL
HDLC SERPL-MCNC: 90 MG/DL
LDLC SERPL CALC-MCNC: 48 MG/DL (ref 0–100)
POTASSIUM SERPL-SCNC: 4.2 MMOL/L (ref 3.5–5.3)
PROT SERPL-MCNC: 6.6 G/DL (ref 6.4–8.4)
SODIUM SERPL-SCNC: 143 MMOL/L (ref 135–147)
TRIGL SERPL-MCNC: 54 MG/DL
TSH SERPL DL<=0.05 MIU/L-ACNC: 1.67 UIU/ML (ref 0.45–4.5)

## 2022-11-22 LAB — HIV 1+2 AB+HIV1 P24 AG SERPL QL IA: NORMAL

## 2023-02-09 ENCOUNTER — CONSULT (OUTPATIENT)
Dept: CARDIOLOGY CLINIC | Facility: CLINIC | Age: 38
End: 2023-02-09

## 2023-02-09 VITALS
DIASTOLIC BLOOD PRESSURE: 70 MMHG | BODY MASS INDEX: 30.27 KG/M2 | WEIGHT: 217 LBS | SYSTOLIC BLOOD PRESSURE: 112 MMHG | HEART RATE: 57 BPM

## 2023-02-09 DIAGNOSIS — R55 NEAR SYNCOPE: ICD-10-CM

## 2023-02-09 DIAGNOSIS — I44.0 1ST DEGREE AV BLOCK: ICD-10-CM

## 2023-02-09 DIAGNOSIS — R94.31 ABNORMAL EKG: ICD-10-CM

## 2023-02-09 DIAGNOSIS — R53.1 WEAKNESS: Primary | ICD-10-CM

## 2023-02-09 NOTE — PROGRESS NOTES
Outpatient Consultation - General Cardiology   Raeann Holstein 30 y o  male   MRN: [id number]  Encounter: [id number]      PCP: DILMA Richardson    History of Present Illness   Physician Requesting Consult: Consults   Reason for Consult / Principal Problem:     HPI: Raeann Holstein is a 30y o  year old male, with no significant past medical history who was referred to cardiology after a presyncopal work-up showed first-degree AV block, sinus bradycardia, in the setting of taking over-the-counter diuretics for weight lifting competition  The patient presented to the ED on 11/1 for general weakness after taking 4 Xpel diuretic pills as part of his preparation routine  The patient states he was on the toilet when he felt suddenly like he could not talk  He felt very weak and fell off the toilet and yelled out to his wife  He did not lose consciousness  Symptoms arose when he was sitting on the toilet and they persisted for a few minutes  His wife called 46 and per notes he had a low blood pressure with EMS  At that time in the ED, Cr 1 4, Na 140, K 3 7, hs trop <2  EKG showed: sinus bradycardia, marked 1st degree av block, nonspecific T wave abnormality  CT head was done which showed no acute intracranial abnormality  Chest x-ray was done which showed no acute cardiopulmonary disease  The patient states that he had been restricting fluid and calories and also taking over-the-counter diuretics in 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  To prep for the shows he will cut 30 to 40 pounds in the preceding months before the show  He restricts fluid intake and restricts calories to about 1200 amy/day  He took 4 pills of Xpel over-the-counter diuretic the day where the event happened  He also takes daily supplements: Creatine, glutamine, l arginine, theodine?, beta alanine,     The patient improved in the ED with fluids   He was discharged and follow-up with his PCP and was referred to cardiology for the abnormal NC interval     Since the event the patient is felt pretty well  He is continuing to work out 5-7 times a week  Mostly does weight lifting but also does 2 to 3 hours a week of cardio  He rarely gets any symptoms exercising  Intermittently he will get lightheaded when he is doing his supers at but it goes away after resting  No chest pain on exertion, no shortness of breath no palpitations  EKG in the office today shows marked first-degree AV block  Review of Systems  Review of system was conducted and was negative except for as stated in the HPI  Historical Information   No past medical history on file  No past surgical history on file  Social History     Substance and Sexual Activity   Alcohol Use No     Social History     Substance and Sexual Activity   Drug Use No     Social History     Tobacco Use   Smoking Status Never   Smokeless Tobacco Never     Family History:  Grandparents heart disease  Meds/Allergies   Home Medications:   Current Outpatient Medications:   •  albuterol (PROVENTIL HFA,VENTOLIN HFA) 90 mcg/act inhaler, TAKE 2 PUFFS BY MOUTH EVERY 6 HOURS AS NEEDED FOR WHEEZE OR FOR SHORTNESS OF BREATH, Disp: 8 g, Rfl: 3  •  fluticasone (FLONASE) 50 mcg/act nasal spray, 2 sprays into each nostril daily, Disp: 16 g, Rfl: 0  •  predniSONE 10 mg tablet, 4 x 3 days, 3 x 1, 2 x 1, 1 x 1, Disp: 18 tablet, Rfl: 0  •  Pulmicort Flexhaler 90 MCG/ACT inhaler, TAKE 1 PUFF BY MOUTH TWICE A DAY, Disp: 1 each, Rfl: 3    No Known Allergies      Objective   Vitals: There were no vitals taken for this visit  Physical Exam  GEN: Jose Luis Palm muscular physique      HEENT:  Normocephalic, atraumatic, anicteric, moist mucous membranes  NECK: No JVD or carotid bruits   HEART: bradycardic, reg rhythm, normal S1 and S2, no murmurs, clicks, gallops or rubs   LUNGS: Clear to auscultation bilaterally; no wheezes, rales, or rhonchi; respiration nonlabored   ABDOMEN: Normoactive bowel sounds, soft, no tenderness, no distention  EXTREMITIES: peripheral pulses palpable; no edema  NEURO: no gross focal findings; cranial nerves grossly intact   SKIN:  Dry, intact, warm to touch    Lab Results: I have personally reviewed pertinent lab results  Lab Results   Component Value Date    HSTNI0 <2 11/01/2022    HSTNI2 2 11/01/2022     No results found for: NTBNP  Lab Results   Component Value Date    TRIG 54 11/20/2022    HDL 90 11/20/2022    LDLCALC 48 11/20/2022     Lab Results   Component Value Date    K 4 2 11/20/2022    CO2 28 11/20/2022     (H) 11/20/2022    BUN 16 11/20/2022    CREATININE 1 35 (H) 11/20/2022     (H) 11/20/2022    AST 39 11/20/2022     Lab Results   Component Value Date    WBC 5 08 11/01/2022    HGB 12 5 11/01/2022    HCT 36 6 11/01/2022    MCV 91 11/01/2022     11/01/2022     No results found for: INR      Imaging: I have personally reviewed pertinent reports  Assessment/Plan   37-year-old  presents for cardiology follow up after a pre-syncopal event where work up showed sinus bradycardia and marked first degree AV block  #Marked first degree AV block  #Pre-syncopal episode: On November 1 patient had an episode where he felt sudden onset weakness, inability to speak, and hand shaking which was in the setting of taking multiple over-the-counter diuretics and fluid and calorie restriction while he was preparing for an upcoming bodybuilding show  Work-up in the ED did not show any significant electrolyte abnormalities  ED physician said he was dehydrated  EKG showed first-degree AV block and sinus bradycardia  Repeat EKG in office today shows AZ interval of at least 420 ms with heart rate of 57 bpm   Patient denies taking any anabolic steroids  The symptomatic event waslikely in the setting of calorie and fluid restriction and because he was taking diuretic    Because he had word finding difficulties and hand shaking will refer to neurology  We will also order echo to rule out structural heart disease  He does have a marked first-degree AV block (TN interval 420 ms) however I do not think this is causing symptoms and is likely due to extensive athletic conditioning, however will order Holter to rule out any more significant arrhythmia     -neurology consult  -TTE odered  -Holter ordered    Case discussed and reviewed with Dr Wally Henson who agrees with my assessment and plan      Beatriz Polo MD  Cardiology Fellow   PGY-4

## 2023-02-23 ENCOUNTER — HOSPITAL ENCOUNTER (OUTPATIENT)
Dept: NON INVASIVE DIAGNOSTICS | Facility: HOSPITAL | Age: 38
Discharge: HOME | End: 2023-02-23

## 2023-02-23 VITALS
WEIGHT: 217 LBS | HEIGHT: 71 IN | SYSTOLIC BLOOD PRESSURE: 112 MMHG | BODY MASS INDEX: 30.38 KG/M2 | DIASTOLIC BLOOD PRESSURE: 70 MMHG | HEART RATE: 57 BPM

## 2023-02-23 DIAGNOSIS — I44.0 1ST DEGREE AV BLOCK: ICD-10-CM

## 2023-02-23 DIAGNOSIS — R55 NEAR SYNCOPE: ICD-10-CM

## 2023-02-23 DIAGNOSIS — R94.31 ABNORMAL EKG: ICD-10-CM

## 2023-02-23 LAB
AORTIC ROOT: 3.4 CM
APICAL FOUR CHAMBER EJECTION FRACTION: 56 %
ASCENDING AORTA: 3.3 CM
E WAVE DECELERATION TIME: 174 MS
FRACTIONAL SHORTENING: 28 % (ref 28–44)
GLOBAL LONGITUIDAL STRAIN: -18 %
INTERVENTRICULAR SEPTUM IN DIASTOLE (PARASTERNAL SHORT AXIS VIEW): 0.9 CM
INTERVENTRICULAR SEPTUM: 0.9 CM (ref 0.6–1.1)
LAAS-AP2: 20.8 CM2
LAAS-AP4: 23 CM2
LEFT ATRIUM SIZE: 3.1 CM
LEFT INTERNAL DIMENSION IN SYSTOLE: 3.3 CM (ref 2.1–4)
LEFT VENTRICULAR INTERNAL DIMENSION IN DIASTOLE: 4.6 CM (ref 3.5–6)
LEFT VENTRICULAR POSTERIOR WALL IN END DIASTOLE: 1 CM
LEFT VENTRICULAR STROKE VOLUME: 53 ML
LVSV (TEICH): 53 ML
MV E'TISSUE VEL-LAT: 17 CM/S
MV E'TISSUE VEL-SEP: 10 CM/S
MV PEAK A VEL: 0.5 M/S
MV PEAK E VEL: 61 CM/S
MV STENOSIS PRESSURE HALF TIME: 50 MS
MV VALVE AREA P 1/2 METHOD: 4.4 CM2
RIGHT ATRIUM AREA SYSTOLE A4C: 25.1 CM2
RIGHT VENTRICLE ID DIMENSION: 4.3 CM
SL CV LEFT ATRIUM LENGTH A2C: 5.6 CM
SL CV LV EF: 55
SL CV PED ECHO LEFT VENTRICLE DIASTOLIC VOLUME (MOD BIPLANE) 2D: 95 ML
SL CV PED ECHO LEFT VENTRICLE SYSTOLIC VOLUME (MOD BIPLANE) 2D: 43 ML
TR MAX PG: 21 MMHG
TR PEAK VELOCITY: 2.3 M/S
TRICUSPID ANNULAR PLANE SYSTOLIC EXCURSION: 2.9 CM
TRICUSPID VALVE PEAK REGURGITATION VELOCITY: 2.27 M/S

## 2023-04-26 ENCOUNTER — TELEPHONE (OUTPATIENT)
Dept: NON INVASIVE DIAGNOSTICS | Facility: HOSPITAL | Age: 38
End: 2023-04-26

## 2023-04-26 NOTE — TELEPHONE ENCOUNTER
Called patient and left a voicemail  Let him know recent echocardiogram which was normal and Holter did show prolonged AK interval which was known and intermittent high-grade Wenckebach and SA kyra block during sleeping hours  Arrhythmias are likely due to high vagal tone given athletic conditioning  I still suspect that syncopal episode was due to volume depletion due to diuretic use  Patient notified to reach out to cardiology office if he is having more episodes of lightheadedness or syncope      Jaspal Morotn MD

## 2023-08-31 ENCOUNTER — OFFICE VISIT (OUTPATIENT)
Dept: FAMILY MEDICINE CLINIC | Facility: CLINIC | Age: 38
End: 2023-08-31

## 2023-08-31 VITALS
BODY MASS INDEX: 28.39 KG/M2 | HEART RATE: 57 BPM | WEIGHT: 202.8 LBS | RESPIRATION RATE: 18 BRPM | HEIGHT: 71 IN | TEMPERATURE: 97.1 F | SYSTOLIC BLOOD PRESSURE: 118 MMHG | DIASTOLIC BLOOD PRESSURE: 64 MMHG | OXYGEN SATURATION: 99 %

## 2023-08-31 DIAGNOSIS — R10.13 EPIGASTRIC PAIN: ICD-10-CM

## 2023-08-31 DIAGNOSIS — R10.32 LEFT LOWER QUADRANT ABDOMINAL PAIN: Primary | ICD-10-CM

## 2023-08-31 NOTE — PROGRESS NOTES
Passive exposure: Never   • Smokeless tobacco: Never   Vaping Use   • Vaping Use: Never used   Substance and Sexual Activity   • Alcohol use: No   • Drug use: No   • Sexual activity: Yes     Partners: Female     Birth control/protection: None, Condom Male   Other Topics Concern   • None   Social History Narrative    CAFFEINE USE :  OCCASIONAL TEA    Lives with wife    House    Full time employment     Social Determinants of Health     Financial Resource Strain: Not on file   Food Insecurity: Not on file   Transportation Needs: Not on file   Physical Activity: Not on file   Stress: Not on file   Social Connections: Not on file   Intimate Partner Violence: Not on file   Housing Stability: Not on file         Family History:     Family History   Problem Relation Age of Onset   • Heart disease Mother    • Heart disease Maternal Grandfather         Current Medications:     Current Outpatient Medications:   •  MULTIPLE VITAMIN PO, Take by mouth, Disp: , Rfl:   •  PREBIOTIC PRODUCT PO, Take by mouth, Disp: , Rfl:      Allergies:   No Known Allergies     Physical Exam:     /64   Pulse 57   Temp (!) 97.1 °F (36.2 °C) (Tympanic)   Resp 18   Ht 5' 11" (1.803 m)   Wt 92 kg (202 lb 12.8 oz)   SpO2 99%   BMI 28.28 kg/m²     Physical Exam     Data:     Laboratory Results: {Labs reviewed:34835}  Radiology/Other Diagnostic Testing Results: {Results Review Statement:37264}    Patient Instructions   Nexium over the counter daily for two weeks     Diet for Stomach Gastritis   WHAT YOU NEED TO KNOW:   A diet for stomach ulcers and gastritis is a meal plan that limits foods that irritate your stomach. Certain foods may worsen symptoms such as stomach pain, bloating, heartburn, or indigestion. DISCHARGE INSTRUCTIONS:   Foods to limit or avoid:  You may need to avoid acidic, spicy, or high-fat foods. Not all foods affect everyone the same way. You will need to learn which foods worsen your symptoms and limit those foods. The following are some foods that may worsen ulcer or gastritis symptoms:  Beverages:      Whole milk and chocolate milk    Hot cocoa and cola    Any beverage with caffeine    Regular and decaffeinated coffee    Peppermint and spearmint tea    Green and black tea, with or without caffeine    Orange and grapefruit juices    Drinks that contain alcohol    Spices and seasonings:      Black and red pepper    Chili powder    Mustard seed and nutmeg    Other foods:      Dairy foods made from whole milk or cream    Chocolate    Spicy or strongly flavored cheeses, such as jalapeno or black pepper    Highly seasoned, high-fat meats, such as sausage, salami, ventura, ham, and cold cuts    Hot chiles and peppers    Tomato products, such as tomato paste, tomato sauce, or tomato juice    Foods to include:  Eat a variety of healthy foods from all the food groups. Eat fruits, vegetables, whole grains, and fat-free or low-fat dairy foods. Whole grains include whole-wheat breads, cereals, pasta, and brown rice. Choose lean meats, poultry (chicken and turkey), fish, beans, eggs, and nuts. A healthy meal plan is low in unhealthy fats, salt, and added sugar. Healthy fats include olive oil and canola oil. Ask your dietitian for more information about a healthy diet. Other helpful guidelines:   Do not eat right before bedtime. Stop eating at least 2 hours before bedtime. Eat small, frequent meals. Your stomach may tolerate small, frequent meals better than large meals. © Copyright Artie Petty 2022 Information is for End User's use only and may not be sold, redistributed or otherwise used for commercial purposes. The above information is an  only. It is not intended as medical advice for individual conditions or treatments. Talk to your doctor, nurse or pharmacist before following any medical regimen to see if it is safe and effective for you.       [facility]

## 2023-08-31 NOTE — ASSESSMENT & PLAN NOTE
The patient states he started with sharp abdominal pains on August 7th. The pain was mainly located in the left quadrant as well as epigastric pain. He was unable to work and states he stayed in bed for five days. He also reports decreased appetite, early satiety, nausea without vomiting and fatigue. No diarrhea or constipation noted. The patient states it was nine days until he felt somewhat better. He continues with a decreased appetite. He did try over the counter pepto bismol and other products without relief of his symptoms. He is not on any prescription medication. He does take creatine for body building. On exam the patient still has residual left lower quadrant discomfort as well as epigastric discomfort with palpation. Blood work ordered, CT of the abdomen and pelvis ordered. Refer to gastroenterology .  If pain worsens or other symptoms worsen, report to emergency room

## 2023-08-31 NOTE — PATIENT INSTRUCTIONS
Nexium over the counter daily for two weeks     Diet for Stomach Gastritis   WHAT YOU NEED TO KNOW:   A diet for stomach ulcers and gastritis is a meal plan that limits foods that irritate your stomach. Certain foods may worsen symptoms such as stomach pain, bloating, heartburn, or indigestion. DISCHARGE INSTRUCTIONS:   Foods to limit or avoid:  You may need to avoid acidic, spicy, or high-fat foods. Not all foods affect everyone the same way. You will need to learn which foods worsen your symptoms and limit those foods. The following are some foods that may worsen ulcer or gastritis symptoms:  Beverages:      Whole milk and chocolate milk    Hot cocoa and cola    Any beverage with caffeine    Regular and decaffeinated coffee    Peppermint and spearmint tea    Green and black tea, with or without caffeine    Orange and grapefruit juices    Drinks that contain alcohol    Spices and seasonings:      Black and red pepper    Chili powder    Mustard seed and nutmeg    Other foods:      Dairy foods made from whole milk or cream    Chocolate    Spicy or strongly flavored cheeses, such as jalapeno or black pepper    Highly seasoned, high-fat meats, such as sausage, salami, ventura, ham, and cold cuts    Hot chiles and peppers    Tomato products, such as tomato paste, tomato sauce, or tomato juice    Foods to include:  Eat a variety of healthy foods from all the food groups. Eat fruits, vegetables, whole grains, and fat-free or low-fat dairy foods. Whole grains include whole-wheat breads, cereals, pasta, and brown rice. Choose lean meats, poultry (chicken and turkey), fish, beans, eggs, and nuts. A healthy meal plan is low in unhealthy fats, salt, and added sugar. Healthy fats include olive oil and canola oil. Ask your dietitian for more information about a healthy diet. Other helpful guidelines:   Do not eat right before bedtime. Stop eating at least 2 hours before bedtime. Eat small, frequent meals.   Your stomach may tolerate small, frequent meals better than large meals. © Copyright [facility] 2022 Information is for End User's use only and may not be sold, redistributed or otherwise used for commercial purposes. The above information is an  only. It is not intended as medical advice for individual conditions or treatments. Talk to your doctor, nurse or pharmacist before following any medical regimen to see if it is safe and effective for you.

## 2023-08-31 NOTE — PROGRESS NOTES
[facility] - [facility]    NAME: Hitesh Tran  AGE: 41 y.o. SEX: male  : 1985     DATE: 2023     Assessment and Plan:     Problem List Items Addressed This Visit        Other    Left lower quadrant abdominal pain - Primary     The patient states he started with sharp abdominal pains on . The pain was mainly located in the left quadrant as well as epigastric pain. He was unable to work and states he stayed in bed for five days. He also reports decreased appetite, early satiety, nausea without vomiting and fatigue. No diarrhea or constipation noted. The patient states it was nine days until he felt somewhat better. He continues with a decreased appetite. He did try over the counter pepto bismol and other products without relief of his symptoms. He is not on any prescription medication. He does take creatine for body building. On exam the patient still has residual left lower quadrant discomfort as well as epigastric discomfort with palpation. Blood work ordered, CT of the abdomen and pelvis ordered. Refer to gastroenterology . If pain worsens or other symptoms worsen, report to emergency room          Relevant Orders    CBC and differential    Comprehensive metabolic panel    CT abdomen pelvis w contrast    Ambulatory Referral to Gastroenterology    Epigastric pain     Started on . Some improvement. Eats a fairly lean diet, he is a . Two week trial of nexium recommended. Information regarding dietary triggers provided to the patient. CT ordered, refer to gastroenterology           Relevant Orders    CBC and differential    Comprehensive metabolic panel    Ambulatory Referral to Gastroenterology           No follow-ups on file.      Chief Complaint:     Chief Complaint   Patient presents with   • Abdominal Pain     Sharp pain since          History of Present Illness:      woke up with sharp abdominal pains, unable to get out of bed. Symptoms of decreased appetite, nausea, fatigue. Took about 9 days until he felt better. Tightness in epigastric area. Stomach cramping. No diarrhea or constipation. Decreased appetite continues. Feels full after eating a small amount of food. Tried over the counter medication without relief. Pain mainly of left side and epigastric. Taking creatine for body building. CT of the abdomen and pelvis ordered. Trial of nexium. Refer to gastroenterology . Report to emergency room with worsening symptoms     Review of Systems:     Review of Systems   Constitutional: Positive for fatigue. HENT: Negative. Negative for congestion, postnasal drip, rhinorrhea and trouble swallowing. Eyes: Negative. Negative for visual disturbance. Respiratory: Negative. Negative for choking and shortness of breath. Cardiovascular: Negative. Negative for chest pain. Gastrointestinal: Positive for abdominal pain and nausea. Endocrine: Negative. Genitourinary: Negative. Musculoskeletal: Negative. Negative for arthralgias, back pain, myalgias and neck pain. Skin: Negative. Neurological: Negative for dizziness and headaches. Psychiatric/Behavioral: Negative. Problem List:     Patient Active Problem List   Diagnosis   • Allergic rhinitis   • Chronic migraine without aura   • Restless legs syndrome   • Pain of both elbows   • COVID-19 virus infection   • Elevated fasting glucose   • 1st degree AV block   • Left lower quadrant abdominal pain   • Epigastric pain        Objective:     /64   Pulse 57   Temp (!) 97.1 °F (36.2 °C) (Tympanic)   Resp 18   Ht 5' 11" (1.803 m)   Wt 92 kg (202 lb 12.8 oz)   SpO2 99%   BMI 28.28 kg/m²     Current Outpatient Medications   Medication Sig Dispense Refill   • MULTIPLE VITAMIN PO Take by mouth     • PREBIOTIC PRODUCT PO Take by mouth       No current facility-administered medications for this visit. Physical Exam  Vitals reviewed.    Constitutional: Appearance: Normal appearance. He is normal weight. HENT:      Head: Normocephalic and atraumatic. Nose: Nose normal.      Mouth/Throat:      Mouth: Mucous membranes are moist.   Eyes:      Extraocular Movements: Extraocular movements intact. Pupils: Pupils are equal, round, and reactive to light. Cardiovascular:      Rate and Rhythm: Normal rate and regular rhythm. Pulses: Normal pulses. Heart sounds: Normal heart sounds. Pulmonary:      Effort: Pulmonary effort is normal.      Breath sounds: Normal breath sounds. Abdominal:      General: Abdomen is flat. Bowel sounds are normal.      Palpations: Abdomen is soft. Tenderness: There is abdominal tenderness in the epigastric area, left upper quadrant and left lower quadrant. There is no right CVA tenderness, left CVA tenderness, guarding or rebound. Hernia: No hernia is present. Musculoskeletal:         General: Normal range of motion. Skin:     General: Skin is warm. Neurological:      General: No focal deficit present. Mental Status: He is alert and oriented to person, place, and time. Psychiatric:         Mood and Affect: Mood normal.         Behavior: Behavior normal.         Thought Content:  Thought content normal.         Judgment: Judgment normal.         [facility], [address] [place] [address]

## 2023-08-31 NOTE — ASSESSMENT & PLAN NOTE
Started on August 7th. Some improvement. Eats a fairly lean diet, he is a . Two week trial of nexium recommended. Information regarding dietary triggers provided to the patient.  CT ordered, refer to gastroenterology

## 2023-09-01 ENCOUNTER — APPOINTMENT (OUTPATIENT)
Dept: LAB | Facility: CLINIC | Age: 38
End: 2023-09-01
Payer: COMMERCIAL

## 2023-09-01 DIAGNOSIS — R10.32 LEFT LOWER QUADRANT ABDOMINAL PAIN: ICD-10-CM

## 2023-09-01 DIAGNOSIS — R10.13 EPIGASTRIC PAIN: ICD-10-CM

## 2023-09-01 LAB
ALBUMIN SERPL BCP-MCNC: 4.1 G/DL (ref 3.5–5)
ALP SERPL-CCNC: 118 U/L (ref 34–104)
ALT SERPL W P-5'-P-CCNC: 2004 U/L (ref 7–52)
ANION GAP SERPL CALCULATED.3IONS-SCNC: 5 MMOL/L
AST SERPL W P-5'-P-CCNC: 901 U/L (ref 13–39)
BASOPHILS # BLD AUTO: 0.03 THOUSANDS/ÂΜL (ref 0–0.1)
BASOPHILS NFR BLD AUTO: 1 % (ref 0–1)
BILIRUB SERPL-MCNC: 2.08 MG/DL (ref 0.2–1)
BUN SERPL-MCNC: 16 MG/DL (ref 5–25)
CALCIUM SERPL-MCNC: 9.7 MG/DL (ref 8.4–10.2)
CHLORIDE SERPL-SCNC: 103 MMOL/L (ref 96–108)
CO2 SERPL-SCNC: 30 MMOL/L (ref 21–32)
CREAT SERPL-MCNC: 1.34 MG/DL (ref 0.6–1.3)
EOSINOPHIL # BLD AUTO: 0.12 THOUSAND/ÂΜL (ref 0–0.61)
EOSINOPHIL NFR BLD AUTO: 6 % (ref 0–6)
ERYTHROCYTE [DISTWIDTH] IN BLOOD BY AUTOMATED COUNT: 12.9 % (ref 11.6–15.1)
GFR SERPL CREATININE-BSD FRML MDRD: 67 ML/MIN/1.73SQ M
GLUCOSE P FAST SERPL-MCNC: 73 MG/DL (ref 65–99)
HCT VFR BLD AUTO: 44.1 % (ref 36.5–49.3)
HGB BLD-MCNC: 14.2 G/DL (ref 12–17)
IMM GRANULOCYTES # BLD AUTO: 0 THOUSAND/UL (ref 0–0.2)
IMM GRANULOCYTES NFR BLD AUTO: 0 % (ref 0–2)
LYMPHOCYTES # BLD AUTO: 1.12 THOUSANDS/ÂΜL (ref 0.6–4.47)
LYMPHOCYTES NFR BLD AUTO: 50 % (ref 14–44)
MCH RBC QN AUTO: 31.3 PG (ref 26.8–34.3)
MCHC RBC AUTO-ENTMCNC: 32.2 G/DL (ref 31.4–37.4)
MCV RBC AUTO: 97 FL (ref 82–98)
MONOCYTES # BLD AUTO: 0.34 THOUSAND/ÂΜL (ref 0.17–1.22)
MONOCYTES NFR BLD AUTO: 16 % (ref 4–12)
NEUTROPHILS # BLD AUTO: 0.59 THOUSANDS/ÂΜL (ref 1.85–7.62)
NEUTS SEG NFR BLD AUTO: 27 % (ref 43–75)
NRBC BLD AUTO-RTO: 0 /100 WBCS
PLATELET # BLD AUTO: 220 THOUSANDS/UL (ref 149–390)
PMV BLD AUTO: 10.7 FL (ref 8.9–12.7)
POTASSIUM SERPL-SCNC: 4.1 MMOL/L (ref 3.5–5.3)
PROT SERPL-MCNC: 6.8 G/DL (ref 6.4–8.4)
RBC # BLD AUTO: 4.54 MILLION/UL (ref 3.88–5.62)
SODIUM SERPL-SCNC: 138 MMOL/L (ref 135–147)
WBC # BLD AUTO: 2.2 THOUSAND/UL (ref 4.31–10.16)

## 2023-09-01 PROCEDURE — 80053 COMPREHEN METABOLIC PANEL: CPT

## 2023-09-01 PROCEDURE — 36415 COLL VENOUS BLD VENIPUNCTURE: CPT

## 2023-09-01 PROCEDURE — 85025 COMPLETE CBC W/AUTO DIFF WBC: CPT

## 2023-09-06 ENCOUNTER — TELEPHONE (OUTPATIENT)
Dept: FAMILY MEDICINE CLINIC | Facility: CLINIC | Age: 38
End: 2023-09-06

## 2023-09-06 NOTE — TELEPHONE ENCOUNTER
Called patient to discuss blood work results. Liver enzymes markedly elevated when compared to last year. Patient is a  and is taking over the counter supplements such as creatine, arganine, citrulline, glutamine and others. I did recommended he stop these supplements at this time. He has a CT scan scheduled for tomorrow. I will contact him with those results. He has an appointment with gastroenterology in the near future which he should keep. Repeat liver blood work in 2-3 weeks.

## 2023-09-07 ENCOUNTER — HOSPITAL ENCOUNTER (OUTPATIENT)
Dept: CT IMAGING | Facility: HOSPITAL | Age: 38
End: 2023-09-07
Payer: COMMERCIAL

## 2023-09-07 DIAGNOSIS — R10.32 LEFT LOWER QUADRANT ABDOMINAL PAIN: ICD-10-CM

## 2023-09-07 PROCEDURE — 74177 CT ABD & PELVIS W/CONTRAST: CPT

## 2023-09-07 PROCEDURE — G1004 CDSM NDSC: HCPCS

## 2023-09-07 RX ADMIN — IOHEXOL 100 ML: 350 INJECTION, SOLUTION INTRAVENOUS at 09:49

## 2023-09-11 ENCOUNTER — TELEPHONE (OUTPATIENT)
Dept: FAMILY MEDICINE CLINIC | Facility: CLINIC | Age: 38
End: 2023-09-11

## 2023-09-11 ENCOUNTER — APPOINTMENT (OUTPATIENT)
Dept: LAB | Facility: CLINIC | Age: 38
End: 2023-09-11
Payer: COMMERCIAL

## 2023-09-11 ENCOUNTER — TELEPHONE (OUTPATIENT)
Dept: GASTROENTEROLOGY | Facility: CLINIC | Age: 38
End: 2023-09-11

## 2023-09-11 DIAGNOSIS — R79.89 ABNORMAL LFTS: Primary | ICD-10-CM

## 2023-09-11 DIAGNOSIS — R79.89 ABNORMAL LFTS: ICD-10-CM

## 2023-09-11 LAB
ALBUMIN SERPL BCP-MCNC: 3.8 G/DL (ref 3.5–5)
ALP SERPL-CCNC: 109 U/L (ref 34–104)
ALT SERPL W P-5'-P-CCNC: 1237 U/L (ref 7–52)
AST SERPL W P-5'-P-CCNC: 489 U/L (ref 13–39)
BILIRUB DIRECT SERPL-MCNC: 0.26 MG/DL (ref 0–0.2)
BILIRUB SERPL-MCNC: 0.92 MG/DL (ref 0.2–1)
INR PPP: 1.11 (ref 0.84–1.19)
PROT SERPL-MCNC: 6.5 G/DL (ref 6.4–8.4)
PROTHROMBIN TIME: 14.5 SECONDS (ref 11.6–14.5)

## 2023-09-11 PROCEDURE — 80076 HEPATIC FUNCTION PANEL: CPT

## 2023-09-11 PROCEDURE — 85610 PROTHROMBIN TIME: CPT

## 2023-09-11 PROCEDURE — 36415 COLL VENOUS BLD VENIPUNCTURE: CPT

## 2023-09-11 NOTE — TELEPHONE ENCOUNTER
I contacted the patient to discuss recent CT results. He does have an appointment with gastroenterology tomorrow which I encouraged him to keep. Reminded patient to repeat lab work in 2-3 weeks.

## 2023-09-11 NOTE — TELEPHONE ENCOUNTER
I called patient and discussed about labs and imaging with him. His labs show significantly high liver enzymes. I discussed with him about getting liver enzymes labs today so that the results come in before I see him in the office tomorrow. Patient agreeable. Ordered hepatic function panel and INR. Patient to get labs done today. Also told patient that CT scan showing high stool burden. I will discuss further management with him during office visit tomorrow. Patient agreeable.     Eliot Powell MD  Gastroenterology  [facility]  Date: September 11, 2023

## 2023-09-12 ENCOUNTER — OFFICE VISIT (OUTPATIENT)
Dept: GASTROENTEROLOGY | Facility: CLINIC | Age: 38
End: 2023-09-12
Payer: COMMERCIAL

## 2023-09-12 VITALS
DIASTOLIC BLOOD PRESSURE: 64 MMHG | SYSTOLIC BLOOD PRESSURE: 122 MMHG | WEIGHT: 204.6 LBS | TEMPERATURE: 98.6 F | HEIGHT: 71 IN | BODY MASS INDEX: 28.64 KG/M2

## 2023-09-12 DIAGNOSIS — R19.4 CHANGE IN BOWEL HABITS: ICD-10-CM

## 2023-09-12 DIAGNOSIS — R10.13 EPIGASTRIC PAIN: ICD-10-CM

## 2023-09-12 DIAGNOSIS — R79.89 ABNORMAL LFTS: Primary | ICD-10-CM

## 2023-09-12 DIAGNOSIS — R14.0 BLOATING: ICD-10-CM

## 2023-09-12 DIAGNOSIS — R10.32 LEFT LOWER QUADRANT ABDOMINAL PAIN: ICD-10-CM

## 2023-09-12 PROCEDURE — 99204 OFFICE O/P NEW MOD 45 MIN: CPT | Performed by: PHYSICIAN ASSISTANT

## 2023-09-12 RX ORDER — POLYETHYLENE GLYCOL-3350 AND ELECTROLYTES 236; 6.74; 5.86; 2.97; 22.74 G/274.31G; G/274.31G; G/274.31G; G/274.31G; G/274.31G
POWDER, FOR SOLUTION ORAL
Qty: 4000 ML | Refills: 0 | Status: SHIPPED | OUTPATIENT
Start: 2023-09-12 | End: 2023-09-22 | Stop reason: HOSPADM

## 2023-09-12 NOTE — PATIENT INSTRUCTIONS
Scheduled date of colonoscopy/EGD (as of today): 09/22/2023  Physician performing colonoscopy: Dr. Doretha Shanks   Location of colonoscopy: [address] [place] [address]  Bowel prep reviewed with patient: Golytely   Instructions reviewed with patient by: Catrachita Ellison   Clearances:  N/A

## 2023-09-12 NOTE — PROGRESS NOTES
[facility] - Outpatient Consultation  Marlene Stout 41 y.o. male MRN: [id number]  Encounter: [id number]          ASSESSMENT AND PLAN:      1. Abnormal LFTs  He presents for evaluation of acute liver injury. Labs from 9/1 reveal , ALT 2004, Alk Phos 118, Total Bili 2.08. He immediately stopped his body building supplements. Repeat labs from 9/11 show improvement , ALT 1237, Alk Phos 109, Total Bili 0.92. INR, albumin, platelets normal. Liver appears normal on CT w/ IV contrast. Differential for markedly elevated includes drug-induced (related to body building supplements?), viral hepatitis, autoimmune hepatitis, other. Plan to repeat LFTs, check viral and autoimmune serologies. Advised to continue holding all bodybuilding supplements for now. - Hepatitis panel, acute; Future  - SHAKILA Screen w/ Reflex to Titer/Pattern; Future  - Anti-smooth muscle antibody, IgG; Future  - Ceruloplasmin; Future  - Tissue transglutaminase, IgA; Future  - IgA; Future  - CMV IgG/IgM Antibodies; Future  - EBV acute panel; Future  - IgG, IgA, IgM; Future  - Hepatic function panel; Future    2. Left lower quadrant abdominal pain  3. Epigastric pain  4. Bloating  5. Change in bowel habits  He began having upper and lower GI symptoms 6 weeks ago after eating salmon. Symptoms began with severe abdominal pain, poor appetite, nausea. Those symptoms have improved but he continues to have abdominal spasms/cramping pain in his upper abdomen and left lower quadrant, change in bowel habits with constipation, bloating. CT A/P with IV contrast from 9/7 showed no acute pathology, two segments of suspected transient small bowel intussusception in the left abdomen noted, and moderate colonic stool burden.  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 pylori infection, celiac disease, inflammatory bowel disease, malignancy. He expressed understanding and agrees to proceed. Gave instructions for GoLytely prep. *Of note, he underwent cardiac testing earlier this year due to prior episode of syncope. 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 The cardiologist felt his arrhythmias were likely due to high vagal tone given athletic conditioning. I recommend scheduling his procedures in the hospital setting out of an abundance of caution.    - Ambulatory Referral to Gastroenterology  - EGD; Future  - Colonoscopy; Future  - polyethylene glycol (GaviLyte-G) 4000 mL solution; Take as directed by the office for colonoscopy. Dispense: 4000 mL; Refill: 0  - Tissue transglutaminase, IgA; Future  - IgA; Future    Follow-up after procedures  ______________________________________________________________________    HPI: 82-year-old male with history of chronic migraine and arrhythmia referred by PCP for LLQ pain and epigastric pain. Patient states his symptoms began on August 2. He ate a piece of fresh salmon from the grocery store, and subsequently developed sharp stabbing twisting abdominal pain, poor appetite, nausea. His symptoms were severe and he was out of work for about 1 week. He also developed constipation which lasted about 9 days, and improved with administration of laxative. Since then, he reports crampy spasm type pain in his upper abdomen as well as left lower quadrant. He currently has crampy left lower quadrant pain. He had recurrent constipation issues about 1 to 2 weeks ago and took another laxative which cleaned him out. Since then his bowel movements have been fairly regular. He denies any bloody stools or abnormal weight loss. Prior EGD or colonoscopy. No family history of colon cancer.     He was also noted to have markedly elevated liver enzymes on 9/1, which are improving as of labs from yesterday. He does dissipate in bodybuilding and takes multiple supplements including whey protein, arginine, Citrulline, beta alanine, creatine, ornithine, glutamine, L-carnitine, and a "fat burner" called Vintage Burn containing multiple herbal supplements. He takes ibuprofen occasionally for headaches but this is not frequent. No frequent use of Tylenol. He does not drink alcohol or use recreational drugs. REVIEW OF SYSTEMS:    CONSTITUTIONAL: Denies any fever, chills, rigors, and weight loss. HEENT: No earache or tinnitus. Denies hearing loss or visual disturbances. CARDIOVASCULAR: No chest pain or palpitations. RESPIRATORY: Denies any cough, hemoptysis, shortness of breath or dyspnea on exertion. GASTROINTESTINAL: As noted in the History of Present Illness. GENITOURINARY: No problems with urination. Denies any hematuria or dysuria. NEUROLOGIC: No dizziness or vertigo, denies headaches. MUSCULOSKELETAL: Denies any muscle or joint pain. SKIN: Denies skin rashes or itching. ENDOCRINE: Denies excessive thirst. Denies intolerance to heat or cold. PSYCHOSOCIAL: Denies depression or anxiety. Denies any recent memory loss. Historical Information   No past medical history on file. No past surgical history on file. Social History   Social History     Substance and Sexual Activity   Alcohol Use No     Social History     Substance and Sexual Activity   Drug Use No     Social History     Tobacco Use   Smoking Status Never   • Passive exposure: Never   Smokeless Tobacco Never     Family History   Problem Relation Age of Onset   • Heart disease Mother    • Heart disease Maternal Grandfather        Meds/Allergies       Current Outpatient Medications:   •  MULTIPLE VITAMIN PO  •  PREBIOTIC PRODUCT PO    No Known Allergies        Objective     There were no vitals taken for this visit. There is no height or weight on file to calculate BMI.         PHYSICAL EXAM:      General Appearance:   Alert, cooperative, no distress   HEENT:   Normocephalic, atraumatic, anicteric.     Neck:  Supple, symmetrical, trachea midline   Lungs:   Clear to auscultation bilaterally; no rales, rhonchi or wheezing; respirations unlabored    Heart[de-identified]   Regular rate and rhythm; no murmur, rub, or gallop. Abdomen:   Soft, non-tender, non-distended; normal bowel sounds; no masses, no organomegaly    Genitalia:   Deferred    Rectal:   Deferred    Extremities:  No cyanosis, clubbing or edema    Pulses:  2+ and symmetric    Skin:  No jaundice, rashes, or lesions    Lymph nodes:  No palpable cervical lymphadenopathy        Lab Results:   No visits with results within 1 Day(s) from this visit. Latest known visit with results is:   Appointment on 09/11/2023   Component Date Value   • Total Bilirubin 09/11/2023 0.92    • Bilirubin, Direct 09/11/2023 0.26 (H)    • Alkaline Phosphatase 09/11/2023 109 (H)    • AST 09/11/2023 489 (H)    • ALT 09/11/2023 1,237 (H)    • Total Protein 09/11/2023 6.5    • Albumin 09/11/2023 3.8    • Protime 09/11/2023 14.5    • INR 09/11/2023 1.11          Radiology Results:   CT abdomen pelvis w contrast    Result Date: 9/11/2023  Narrative: CT ABDOMEN AND PELVIS WITH IV CONTRAST INDICATION:   R10.32: Left lower quadrant pain. Decreased appetite, nausea, left lower abdominal pain x2 weeks. Left upper quadrant pain. Epigastric pain. COMPARISON:  None. TECHNIQUE:  CT examination of the abdomen and pelvis was performed. Multiplanar 2D reformatted images were created from the source data. This examination, like all CT scans performed in the [facility], was performed utilizing techniques to minimize radiation dose exposure, including the use of iterative reconstruction and automated exposure control. Radiation dose length product (DLP) for this visit:  374 mGy-cm IV Contrast:  100 mL of iohexol (OMNIPAQUE) Enteric Contrast:  Enteric contrast was not administered.  FINDINGS: ABDOMEN LOWER CHEST:  No clinically significant abnormality identified in the visualized lower chest. LIVER/BILIARY TREE:  Unremarkable. GALLBLADDER:  No calcified gallstones. No pericholecystic inflammatory change. SPLEEN:  Unremarkable. PANCREAS:  Unremarkable. ADRENAL GLANDS:  Unremarkable. KIDNEYS/URETERS:  Unremarkable. No hydronephrosis. STOMACH AND BOWEL: No evidence of obstruction or acute phonatory changes. 2 segments of small bowel intussusception in the left abdomen. No lead point identified. Given the absence of obstruction, these are likely transient. Moderate stool burden in the colon. APPENDIX:  No findings to suggest appendicitis. ABDOMINOPELVIC CAVITY:  No ascites. No pneumoperitoneum. No lymphadenopathy. VESSELS:  Unremarkable for patient's age. PELVIS REPRODUCTIVE ORGANS:  Unremarkable for patient's age. URINARY BLADDER: Decompressed, limiting evaluation. ABDOMINAL WALL/INGUINAL REGIONS:  Unremarkable. OSSEOUS STRUCTURES:  No acute fracture or destructive osseous lesion. Impression: No acute pathology. Two segments of suspected transient small bowel intussusception in the left abdomen noted. Moderate colonic stool burden.  Workstation performed: [id number]     Answers for HPI/ROS submitted by the patient on 9/11/2023  Chronicity: new  Onset: 1 to 4 weeks ago  Onset quality: sudden  Frequency: 2 to 4 times per day  Episode duration: 4 Hours  Progression since onset: gradually improving  Pain location: LLQ, epigastric region  Pain - numeric: 6/10  Pain quality: cramping, a sensation of fullness  Radiates to: RLQ  anorexia: Yes  arthralgias: No  belching: Yes  constipation: Yes  diarrhea: No  dysuria: No  fever: No  flatus: No  frequency: No  headaches: No  hematochezia: No  hematuria: No  melena: No  myalgias: No  nausea: Yes  weight loss: No  vomiting: No  Aggravated by: certain positions, eating  Relieved by: bowel movements, passing flatus

## 2023-09-13 ENCOUNTER — APPOINTMENT (OUTPATIENT)
Dept: LAB | Facility: CLINIC | Age: 38
End: 2023-09-13
Payer: COMMERCIAL

## 2023-09-13 DIAGNOSIS — R14.0 BLOATING: ICD-10-CM

## 2023-09-13 DIAGNOSIS — R79.89 ABNORMAL LFTS: ICD-10-CM

## 2023-09-13 LAB
ALBUMIN SERPL BCP-MCNC: 4 G/DL (ref 3.5–5)
ALP SERPL-CCNC: 107 U/L (ref 34–104)
ALT SERPL W P-5'-P-CCNC: 1153 U/L (ref 7–52)
ANA SER QL IA: NEGATIVE
AST SERPL W P-5'-P-CCNC: 423 U/L (ref 13–39)
BILIRUB DIRECT SERPL-MCNC: 0.32 MG/DL (ref 0–0.2)
BILIRUB SERPL-MCNC: 1.12 MG/DL (ref 0.2–1)
HAV IGM SER QL: NORMAL
HBV CORE IGM SER QL: NORMAL
HBV SURFACE AG SER QL: NORMAL
HCV AB SER QL: NORMAL
IGA SERPL-MCNC: 278 MG/DL (ref 66–433)
IGG SERPL-MCNC: 1044 MG/DL (ref 635–1741)
IGM SERPL-MCNC: 242 MG/DL (ref 45–281)
PROT SERPL-MCNC: 6.6 G/DL (ref 6.4–8.4)

## 2023-09-13 PROCEDURE — 36415 COLL VENOUS BLD VENIPUNCTURE: CPT

## 2023-09-13 PROCEDURE — 82390 ASSAY OF CERULOPLASMIN: CPT

## 2023-09-13 PROCEDURE — 86664 EPSTEIN-BARR NUCLEAR ANTIGEN: CPT

## 2023-09-13 PROCEDURE — 86645 CMV ANTIBODY IGM: CPT

## 2023-09-13 PROCEDURE — 80074 ACUTE HEPATITIS PANEL: CPT

## 2023-09-13 PROCEDURE — 86364 TISS TRNSGLTMNASE EA IG CLAS: CPT

## 2023-09-13 PROCEDURE — 86644 CMV ANTIBODY: CPT

## 2023-09-13 PROCEDURE — 80076 HEPATIC FUNCTION PANEL: CPT

## 2023-09-13 PROCEDURE — 86038 ANTINUCLEAR ANTIBODIES: CPT

## 2023-09-13 PROCEDURE — 86663 EPSTEIN-BARR ANTIBODY: CPT

## 2023-09-13 PROCEDURE — 82784 ASSAY IGA/IGD/IGG/IGM EACH: CPT

## 2023-09-13 PROCEDURE — 86015 ACTIN ANTIBODY EACH: CPT

## 2023-09-13 PROCEDURE — 86665 EPSTEIN-BARR CAPSID VCA: CPT

## 2023-09-14 LAB
ACTIN IGG SERPL-ACNC: 16 UNITS (ref 0–19)
CERULOPLASMIN SERPL-MCNC: 21.2 MG/DL (ref 16–31)
CMV IGG SERPL IA-ACNC: 1.5 U/ML (ref 0–0.59)
CMV IGM SERPL IA-ACNC: <30 AU/ML (ref 0–29.9)
EBV NA IGG SER IA-ACNC: >600 U/ML (ref 0–17.9)
EBV VCA IGG SER IA-ACNC: >600 U/ML (ref 0–17.9)
EBV VCA IGM SER IA-ACNC: 48.5 U/ML (ref 0–35.9)
INTERPRETATION: ABNORMAL
TTG IGA SER-ACNC: <2 U/ML (ref 0–3)

## 2023-09-15 ENCOUNTER — NURSE TRIAGE (OUTPATIENT)
Age: 38
End: 2023-09-15

## 2023-09-15 DIAGNOSIS — R79.89 ABNORMAL LFTS: Primary | ICD-10-CM

## 2023-09-15 NOTE — TELEPHONE ENCOUNTER
Patient calling in, returning call to [facility], he has additional questions regarding CMV and mono virus.  He would like a call back at [phone]

## 2023-09-22 ENCOUNTER — ANESTHESIA (OUTPATIENT)
Dept: GASTROENTEROLOGY | Facility: HOSPITAL | Age: 38
End: 2023-09-22

## 2023-09-22 ENCOUNTER — ANESTHESIA EVENT (OUTPATIENT)
Dept: GASTROENTEROLOGY | Facility: HOSPITAL | Age: 38
End: 2023-09-22

## 2023-09-22 ENCOUNTER — HOSPITAL ENCOUNTER (OUTPATIENT)
Dept: GASTROENTEROLOGY | Facility: HOSPITAL | Age: 38
Setting detail: OUTPATIENT SURGERY
End: 2023-09-22
Attending: INTERNAL MEDICINE | Admitting: INTERNAL MEDICINE
Payer: COMMERCIAL

## 2023-09-22 VITALS
DIASTOLIC BLOOD PRESSURE: 54 MMHG | OXYGEN SATURATION: 99 % | RESPIRATION RATE: 14 BRPM | TEMPERATURE: 97 F | HEART RATE: 51 BPM | SYSTOLIC BLOOD PRESSURE: 112 MMHG

## 2023-09-22 DIAGNOSIS — R10.32 LEFT LOWER QUADRANT ABDOMINAL PAIN: ICD-10-CM

## 2023-09-22 DIAGNOSIS — R19.4 CHANGE IN BOWEL HABITS: ICD-10-CM

## 2023-09-22 DIAGNOSIS — R14.0 BLOATING: ICD-10-CM

## 2023-09-22 DIAGNOSIS — R10.13 EPIGASTRIC PAIN: ICD-10-CM

## 2023-09-22 PROCEDURE — 88305 TISSUE EXAM BY PATHOLOGIST: CPT | Performed by: STUDENT IN AN ORGANIZED HEALTH CARE EDUCATION/TRAINING PROGRAM

## 2023-09-22 RX ORDER — FENTANYL CITRATE 50 UG/ML
INJECTION, SOLUTION INTRAMUSCULAR; INTRAVENOUS AS NEEDED
Status: DISCONTINUED | OUTPATIENT
Start: 2023-09-22 | End: 2023-09-22

## 2023-09-22 RX ORDER — SODIUM CHLORIDE, SODIUM LACTATE, POTASSIUM CHLORIDE, CALCIUM CHLORIDE 600; 310; 30; 20 MG/100ML; MG/100ML; MG/100ML; MG/100ML
100 INJECTION, SOLUTION INTRAVENOUS CONTINUOUS
Status: DISCONTINUED | OUTPATIENT
Start: 2023-09-22 | End: 2023-09-26 | Stop reason: HOSPADM

## 2023-09-22 RX ORDER — PROPOFOL 10 MG/ML
INJECTION, EMULSION INTRAVENOUS AS NEEDED
Status: DISCONTINUED | OUTPATIENT
Start: 2023-09-22 | End: 2023-09-22

## 2023-09-22 RX ORDER — EPHEDRINE SULFATE 50 MG/ML
INJECTION INTRAVENOUS AS NEEDED
Status: DISCONTINUED | OUTPATIENT
Start: 2023-09-22 | End: 2023-09-22

## 2023-09-22 RX ORDER — GLYCOPYRROLATE 0.2 MG/ML
INJECTION INTRAMUSCULAR; INTRAVENOUS AS NEEDED
Status: DISCONTINUED | OUTPATIENT
Start: 2023-09-22 | End: 2023-09-22

## 2023-09-22 RX ORDER — SODIUM CHLORIDE, SODIUM LACTATE, POTASSIUM CHLORIDE, CALCIUM CHLORIDE 600; 310; 30; 20 MG/100ML; MG/100ML; MG/100ML; MG/100ML
INJECTION, SOLUTION INTRAVENOUS CONTINUOUS PRN
Status: DISCONTINUED | OUTPATIENT
Start: 2023-09-22 | End: 2023-09-22

## 2023-09-22 RX ORDER — SODIUM CHLORIDE, SODIUM LACTATE, POTASSIUM CHLORIDE, CALCIUM CHLORIDE 600; 310; 30; 20 MG/100ML; MG/100ML; MG/100ML; MG/100ML
100 INJECTION, SOLUTION INTRAVENOUS CONTINUOUS
Status: CANCELLED | OUTPATIENT
Start: 2023-09-22

## 2023-09-22 RX ADMIN — PROPOFOL 50 MG: 10 INJECTION, EMULSION INTRAVENOUS at 13:48

## 2023-09-22 RX ADMIN — PROPOFOL 50 MG: 10 INJECTION, EMULSION INTRAVENOUS at 13:54

## 2023-09-22 RX ADMIN — FENTANYL CITRATE 25 MCG: 50 INJECTION, SOLUTION INTRAMUSCULAR; INTRAVENOUS at 14:03

## 2023-09-22 RX ADMIN — SODIUM CHLORIDE, SODIUM LACTATE, POTASSIUM CHLORIDE, AND CALCIUM CHLORIDE 100 ML/HR: .6; .31; .03; .02 INJECTION, SOLUTION INTRAVENOUS at 13:35

## 2023-09-22 RX ADMIN — SODIUM CHLORIDE, SODIUM LACTATE, POTASSIUM CHLORIDE, AND CALCIUM CHLORIDE: .6; .31; .03; .02 INJECTION, SOLUTION INTRAVENOUS at 13:39

## 2023-09-22 RX ADMIN — PROPOFOL 100 MG: 10 INJECTION, EMULSION INTRAVENOUS at 13:43

## 2023-09-22 RX ADMIN — FENTANYL CITRATE 50 MCG: 50 INJECTION, SOLUTION INTRAMUSCULAR; INTRAVENOUS at 13:44

## 2023-09-22 RX ADMIN — PROPOFOL 50 MG: 10 INJECTION, EMULSION INTRAVENOUS at 14:08

## 2023-09-22 RX ADMIN — FENTANYL CITRATE 25 MCG: 50 INJECTION, SOLUTION INTRAMUSCULAR; INTRAVENOUS at 14:00

## 2023-09-22 RX ADMIN — PROPOFOL 50 MG: 10 INJECTION, EMULSION INTRAVENOUS at 14:01

## 2023-09-22 RX ADMIN — EPHEDRINE SULFATE 10 MG: 50 INJECTION INTRAVENOUS at 14:01

## 2023-09-22 RX ADMIN — GLYCOPYRROLATE 0.2 MG: 0.4 INJECTION INTRAMUSCULAR; INTRAVENOUS at 13:44

## 2023-09-22 NOTE — H&P
History and Physical -  [facility]  Brandi Gan 41 y.o. male MRN: [id number]                  HPI: Brandi Gan is a 41y.o. year old male who presents for EGD and colonoscopy evaluation of change in bowel habits. REVIEW OF SYSTEMS: Per the HPI, and otherwise unremarkable. Historical Information   No past medical history on file. No past surgical history on file. Social History   Social History     Substance and Sexual Activity   Alcohol Use No     Social History     Substance and Sexual Activity   Drug Use No     Social History     Tobacco Use   Smoking Status Never   • Passive exposure: Never   Smokeless Tobacco Never     Family History   Problem Relation Age of Onset   • Heart disease Mother    • Heart disease Maternal Grandfather        Meds/Allergies       Current Outpatient Medications:   •  MULTIPLE VITAMIN PO  •  polyethylene glycol (GaviLyte-G) 4000 mL solution  •  PREBIOTIC PRODUCT PO    No Known Allergies    Objective     There were no vitals taken for this visit. PHYSICAL EXAM    Gen: NAD  Head: NCAT  CV: RRR  CHEST: Clear  ABD: soft, NT/ND  EXT: no edema      ASSESSMENT/PLAN:  This is a 41y.o. year old male here for EGD evaluation and he is stable and optimized for his procedure.

## 2023-09-22 NOTE — ANESTHESIA POSTPROCEDURE EVALUATION
Post-Op Assessment Note    CV Status:  Stable    Pain management: adequate     Mental Status:  Alert and awake   Hydration Status:  Euvolemic   PONV Controlled:  Controlled   Airway Patency:  Patent      Post Op Vitals Reviewed: Yes      Staff: CRNA         No notable events documented.     BP   92/44   Temp      Pulse  80   Resp   13   SpO2   100

## 2023-09-22 NOTE — ANESTHESIA PREPROCEDURE EVALUATION
Procedure:  EGD  COLONOSCOPY    Relevant Problems   ANESTHESIA (within normal limits)      CARDIO   (+) 1st degree AV block   (+) Chronic migraine without aura      NEURO/PSYCH   (+) Chronic migraine without aura        Physical Exam    Airway    Mallampati score: II  TM Distance: >3 FB  Neck ROM: full     Dental       Cardiovascular  Rate: normal,     Pulmonary  Pulmonary exam normal     Other Findings  Per pt denies anything remaining that is loose or removeable        Anesthesia Plan  ASA Score- 2     Anesthesia Type- IV sedation with anesthesia with ASA Monitors. Additional Monitors:   Airway Plan:     Comment: Per patient, appropriately NPO, denies active CP/SOB/wheezing/symptoms related to heartburn/nausea/vomiting. Plan Factors-Exercise tolerance (METS): >4 METS. Chart reviewed. Patient summary reviewed. Patient is not a current smoker. Induction- intravenous. Postoperative Plan-     Informed Consent- Anesthetic plan and risks discussed with patient. I personally reviewed this patient with the CRNA. Discussed and agreed on the Anesthesia Plan with the CRNA. Mike Petit

## 2023-09-26 DIAGNOSIS — R14.0 BLOATING: Primary | ICD-10-CM

## 2023-09-26 PROCEDURE — 88305 TISSUE EXAM BY PATHOLOGIST: CPT | Performed by: STUDENT IN AN ORGANIZED HEALTH CARE EDUCATION/TRAINING PROGRAM

## 2023-09-30 ENCOUNTER — APPOINTMENT (OUTPATIENT)
Dept: LAB | Facility: CLINIC | Age: 38
End: 2023-09-30
Payer: COMMERCIAL

## 2023-09-30 DIAGNOSIS — R79.89 ABNORMAL LFTS: ICD-10-CM

## 2023-09-30 DIAGNOSIS — R14.0 BLOATING: ICD-10-CM

## 2023-09-30 LAB
ALBUMIN SERPL BCP-MCNC: 3.7 G/DL (ref 3.5–5)
ALP SERPL-CCNC: 69 U/L (ref 34–104)
ALT SERPL W P-5'-P-CCNC: 206 U/L (ref 7–52)
AST SERPL W P-5'-P-CCNC: 87 U/L (ref 13–39)
BILIRUB DIRECT SERPL-MCNC: 0.14 MG/DL (ref 0–0.2)
BILIRUB SERPL-MCNC: 0.58 MG/DL (ref 0.2–1)
PROT SERPL-MCNC: 6.4 G/DL (ref 6.4–8.4)

## 2023-09-30 PROCEDURE — 82784 ASSAY IGA/IGD/IGG/IGM EACH: CPT

## 2023-09-30 PROCEDURE — 86364 TISS TRNSGLTMNASE EA IG CLAS: CPT

## 2023-09-30 PROCEDURE — 86258 DGP ANTIBODY EACH IG CLASS: CPT

## 2023-09-30 PROCEDURE — 80076 HEPATIC FUNCTION PANEL: CPT

## 2023-09-30 PROCEDURE — 86231 EMA EACH IG CLASS: CPT

## 2023-09-30 PROCEDURE — 36415 COLL VENOUS BLD VENIPUNCTURE: CPT

## 2023-10-01 DIAGNOSIS — R74.8 ELEVATED LIVER ENZYMES: Primary | ICD-10-CM

## 2023-10-03 LAB
ENDOMYSIUM IGA SER QL: NEGATIVE
GLIADIN PEPTIDE IGA SER-ACNC: 4 UNITS (ref 0–19)
GLIADIN PEPTIDE IGG SER-ACNC: 2 UNITS (ref 0–19)
IGA SERPL-MCNC: 232 MG/DL (ref 90–386)
TTG IGA SER-ACNC: <2 U/ML (ref 0–3)
TTG IGG SER-ACNC: <2 U/ML (ref 0–5)

## 2023-10-06 DIAGNOSIS — R79.89 ABNORMAL LFTS: ICD-10-CM

## 2023-10-06 DIAGNOSIS — R10.13 EPIGASTRIC PAIN: Primary | ICD-10-CM

## 2023-10-13 ENCOUNTER — HOSPITAL ENCOUNTER (OUTPATIENT)
Dept: RADIOLOGY | Facility: HOSPITAL | Age: 38
Discharge: HOME | End: 2023-10-13
Payer: COMMERCIAL

## 2023-10-13 ENCOUNTER — APPOINTMENT (OUTPATIENT)
Dept: LAB | Facility: CLINIC | Age: 38
End: 2023-10-13
Payer: COMMERCIAL

## 2023-10-13 DIAGNOSIS — R79.89 ABNORMAL LFTS: Primary | ICD-10-CM

## 2023-10-13 DIAGNOSIS — R79.89 ABNORMAL LFTS: ICD-10-CM

## 2023-10-13 DIAGNOSIS — R74.8 ELEVATED LIVER ENZYMES: ICD-10-CM

## 2023-10-13 DIAGNOSIS — R10.13 EPIGASTRIC PAIN: ICD-10-CM

## 2023-10-13 LAB
ALBUMIN SERPL BCP-MCNC: 4.1 G/DL (ref 3.5–5)
ALP SERPL-CCNC: 67 U/L (ref 34–104)
ALT SERPL W P-5'-P-CCNC: 147 U/L (ref 7–52)
AST SERPL W P-5'-P-CCNC: 72 U/L (ref 13–39)
BILIRUB DIRECT SERPL-MCNC: 0.13 MG/DL (ref 0–0.2)
BILIRUB SERPL-MCNC: 0.84 MG/DL (ref 0.2–1)
PROT SERPL-MCNC: 6.8 G/DL (ref 6.4–8.4)

## 2023-10-13 PROCEDURE — 76705 ECHO EXAM OF ABDOMEN: CPT

## 2023-10-13 PROCEDURE — 36415 COLL VENOUS BLD VENIPUNCTURE: CPT

## 2023-10-13 PROCEDURE — 80076 HEPATIC FUNCTION PANEL: CPT
